# Patient Record
Sex: FEMALE | Race: WHITE | Employment: OTHER | URBAN - METROPOLITAN AREA
[De-identification: names, ages, dates, MRNs, and addresses within clinical notes are randomized per-mention and may not be internally consistent; named-entity substitution may affect disease eponyms.]

---

## 2021-11-28 ENCOUNTER — HOSPITAL ENCOUNTER (EMERGENCY)
Age: 86
Discharge: HOME | End: 2021-11-28
Attending: EMERGENCY MEDICINE
Payer: MEDICARE

## 2021-11-28 ENCOUNTER — APPOINTMENT (OUTPATIENT)
Dept: CT IMAGING | Age: 86
End: 2021-11-28
Attending: EMERGENCY MEDICINE
Payer: MEDICARE

## 2021-11-28 ENCOUNTER — APPOINTMENT (OUTPATIENT)
Dept: GENERAL RADIOLOGY | Age: 86
End: 2021-11-28
Attending: EMERGENCY MEDICINE
Payer: MEDICARE

## 2021-11-28 VITALS
TEMPERATURE: 97.8 F | HEART RATE: 91 BPM | OXYGEN SATURATION: 95 % | WEIGHT: 141.54 LBS | SYSTOLIC BLOOD PRESSURE: 135 MMHG | RESPIRATION RATE: 25 BRPM | DIASTOLIC BLOOD PRESSURE: 78 MMHG

## 2021-11-28 DIAGNOSIS — S40.021A CONTUSION OF RIGHT UPPER EXTREMITY, INITIAL ENCOUNTER: ICD-10-CM

## 2021-11-28 DIAGNOSIS — S00.83XA CONTUSION OF FACE, INITIAL ENCOUNTER: ICD-10-CM

## 2021-11-28 DIAGNOSIS — S80.11XA CONTUSION OF RIGHT LOWER LEG, INITIAL ENCOUNTER: ICD-10-CM

## 2021-11-28 DIAGNOSIS — S80.12XA CONTUSION OF LEFT LOWER LEG, INITIAL ENCOUNTER: ICD-10-CM
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
## 2021-11-28 PROCEDURE — 73590 X-RAY EXAM OF LOWER LEG: CPT

## 2021-11-28 PROCEDURE — 73060 X-RAY EXAM OF HUMERUS: CPT

## 2021-11-28 PROCEDURE — 70450 CT HEAD/BRAIN W/O DYE: CPT

## 2021-11-28 PROCEDURE — 99284 EMERGENCY DEPT VISIT MOD MDM: CPT

## 2021-11-28 PROCEDURE — 70486 CT MAXILLOFACIAL W/O DYE: CPT

## 2021-11-28 PROCEDURE — 74011250637 HC RX REV CODE- 250/637: Performed by: EMERGENCY MEDICINE

## 2021-11-28 RX ORDER — ACETAMINOPHEN 500 MG
1000 TABLET ORAL ONCE
Status: COMPLETED | OUTPATIENT
Start: 2021-11-28 | End: 2021-11-28

## 2021-11-28 RX ADMIN — ACETAMINOPHEN 1000 MG: 500 TABLET ORAL at 19:51

## 2021-11-28 NOTE — ED TRIAGE NOTES
Triage Note: Patient arrives to ER via EMS from [facility] for a fall. Patient reports slipping out of bed this morning. Denies hitting her head.

## 2021-11-29 NOTE — ED NOTES
Patient given discharge papers and instructions by primary RN> patient verbalized understanding and stated not having questions or concerns regarding her care. patient ambulatory out of ED with family.

## 2021-11-30 NOTE — ED PROVIDER NOTES
53-year-old female presents to the emergency department by EMS from assisted living facility after she states that she slipped while getting out of bed this morning. She states that she does not remember hitting her head but had some tenderness across the right side of her face, as well as noting tenderness to her right upper extremity where she has a large bruise over her right tricep/humeral region. She also notes bilateral lower extremity tenderness from the knees down across her shins that is particularly worse when she walks although she has been able to ambulate on them without significant difficulty. She denies any vision changes, difficulty speaking, walking, numbness or weakness. No past medical history on file. No past surgical history on file. No family history on file. Social History     Socioeconomic History    Marital status:      Spouse name: Not on file    Number of children: Not on file    Years of education: Not on file    Highest education level: Not on file   Occupational History    Not on file   Tobacco Use    Smoking status: Not on file    Smokeless tobacco: Not on file   Substance and Sexual Activity    Alcohol use: Not on file    Drug use: Not on file    Sexual activity: Not on file   Other Topics Concern    Not on file   Social History Narrative    Not on file     Social Determinants of Health     Financial Resource Strain:     Difficulty of Paying Living Expenses: Not on file   Food Insecurity:     Worried About Running Out of Food in the Last Year: Not on file    Kacie of Food in the Last Year: Not on file   Transportation Needs:     Lack of Transportation (Medical): Not on file    Lack of Transportation (Non-Medical):  Not on file   Physical Activity:     Days of Exercise per Week: Not on file    Minutes of Exercise per Session: Not on file   Stress:     Feeling of Stress : Not on file   Social Connections:     Frequency of Communication with Friends and Family: Not on file    Frequency of Social Gatherings with Friends and Family: Not on file    Attends Muslim Services: Not on file    Active Member of Clubs or Organizations: Not on file    Attends Club or Organization Meetings: Not on file    Marital Status: Not on file   Intimate Partner Violence:     Fear of Current or Ex-Partner: Not on file    Emotionally Abused: Not on file    Physically Abused: Not on file    Sexually Abused: Not on file   Housing Stability:     Unable to Pay for Housing in the Last Year: Not on file    Number of [place] in the Last Year: Not on file    Unstable Housing in the Last Year: Not on file         ALLERGIES: Patient has no known allergies. Review of Systems   Constitutional: Negative for activity change, appetite change, chills and fever. HENT: Negative for congestion, rhinorrhea, sinus pressure, sneezing and sore throat. Eyes: Negative for photophobia and visual disturbance. Respiratory: Negative for cough and shortness of breath. Cardiovascular: Negative for chest pain. Gastrointestinal: Negative for abdominal pain, blood in stool, constipation, diarrhea, nausea and vomiting. Genitourinary: Negative for difficulty urinating, dysuria, flank pain, frequency, hematuria, menstrual problem, urgency, vaginal bleeding and vaginal discharge. Musculoskeletal: Positive for arthralgias. Negative for back pain, myalgias and neck pain. Skin: Negative for rash and wound. Neurological: Negative for syncope, weakness, numbness and headaches. Psychiatric/Behavioral: Negative for self-injury and suicidal ideas. All other systems reviewed and are negative. Vitals:    11/28/21 1850 11/28/21 2000   BP: (!) 132/95 135/78   Pulse: 91    Resp: 25    Temp: 97.8 °F (36.6 °C)    SpO2: 94% 95%   Weight: 64.2 kg (141 lb 8.6 oz)             Physical Exam  Vitals and nursing note reviewed.    Constitutional:       General: She is not in acute distress. Appearance: Normal appearance. She is well-developed. She is not diaphoretic. HENT:      Head: Normocephalic. Contusion present. Nose: Nose normal.   Eyes:      Extraocular Movements: Extraocular movements intact. Conjunctiva/sclera: Conjunctivae normal.      Pupils: Pupils are equal, round, and reactive to light. Cardiovascular:      Rate and Rhythm: Normal rate and regular rhythm. Heart sounds: Normal heart sounds. Pulmonary:      Effort: Pulmonary effort is normal.      Breath sounds: Normal breath sounds. Chest:      Chest wall: No tenderness. Abdominal:      General: There is no distension. Palpations: Abdomen is soft. Tenderness: There is no abdominal tenderness. Musculoskeletal:         General: Tenderness and signs of injury present. Arms:       Cervical back: Neck supple. Legs:    Skin:     General: Skin is warm and dry. Neurological:      General: No focal deficit present. Mental Status: She is alert and oriented to person, place, and time. Cranial Nerves: No cranial nerve deficit. Sensory: No sensory deficit. Motor: No weakness. Coordination: Coordination normal.      Comments: Intact sensation, 5/5 strength in all 4 extremities, intact finger to nose, neg pronator drift, fluent speech, CN intact. MDM   72-year-old female presents with GL F from assisted living facility. X-rays were reviewed by myself and read by radiology showing no acute bony abnormalities. CT head and CT maxillofacial negative for any acute intracranial abnormalities. Reassurance was given. She was given dose of Tylenol in the ED and recommended the same. Can also use ice packs for symptomatic relief and PCP follow-up was recommended as needed. Return precautions were given for worsening or concerns. Please note that this dictation was completed with [employer], the [employer] voice recognition software.   Quite often unanticipated grammatical, syntax, homophones, and other interpretive errors are inadvertently transcribed by the computer software. Please disregard these errors. Please excuse any errors that have escaped final proofreading.     Procedures

## 2023-08-21 ENCOUNTER — HOSPITAL ENCOUNTER (OUTPATIENT)
Facility: HOSPITAL | Age: 88
Discharge: HOME | End: 2023-08-24

## 2023-08-21 DIAGNOSIS — Z12.31 VISIT FOR SCREENING MAMMOGRAM: ICD-10-CM

## 2024-02-15 ENCOUNTER — HOSPITAL ENCOUNTER (EMERGENCY)
Facility: HOSPITAL | Age: 89
Discharge: HOME | End: 2024-02-15
Attending: EMERGENCY MEDICINE
Payer: MEDICARE

## 2024-02-15 ENCOUNTER — APPOINTMENT (OUTPATIENT)
Facility: HOSPITAL | Age: 89
End: 2024-02-15
Payer: MEDICARE

## 2024-02-15 VITALS
DIASTOLIC BLOOD PRESSURE: 64 MMHG | HEIGHT: 65 IN | TEMPERATURE: 97.7 F | WEIGHT: 144.4 LBS | BODY MASS INDEX: 24.06 KG/M2 | HEART RATE: 57 BPM | SYSTOLIC BLOOD PRESSURE: 125 MMHG | OXYGEN SATURATION: 95 % | RESPIRATION RATE: 16 BRPM

## 2024-02-15 DIAGNOSIS — R09.89 CHOKING EPISODE: Primary | ICD-10-CM

## 2024-02-15 PROCEDURE — 71045 X-RAY EXAM CHEST 1 VIEW: CPT

## 2024-02-15 PROCEDURE — 99283 EMERGENCY DEPT VISIT LOW MDM: CPT

## 2024-02-16 NOTE — ED TRIAGE NOTES
Patient advised that she was eating a sandwich about 1815 hours, shortly thereafter she began choking and was coughing. EMS advised that she was able to cough up a piece of tomato and since then she has not been coughing. Stated at this time she feels fine. Able to speak clearly, no dyspnea appreciated.

## 2024-02-16 NOTE — DISCHARGE INSTRUCTIONS
Please monitor for any future signs of aspiration pneumonia.  Your x-ray today is normal.  You do not need antibiotics.

## 2024-02-16 NOTE — ED NOTES
Patient left ED in no acute distress, alert and oriented x4. Patient was encourage to come back if symptoms get worse. Patient was provided with discharge instructions. All questions were answered. Patient left ambulatory with a walker.

## 2024-02-16 NOTE — ED PROVIDER NOTES
[facility]  EMERGENCY DEPARTMENT ENCOUNTER      Pt Name: Marielle Godoy  MRN: [id number]  Birthdate 1935  Date of evaluation: 2/15/2024  Provider: Maurice Middleton MD      HISTORY OF PRESENT ILLNESS      89-year-old female with a history of essential tremor presenting to the ER for a choking episode.  [facility]  EMS was called, they noticed her to be having a frequent hacking cough.  On the right over she eventually coughed up a piece of tomato.  This resolved her coughing spell.  Patient states she feels fine upon arrival to the ER.  Denies any ongoing dyspnea.  No hypoxia in route per EMS              Nursing Notes were reviewed.    REVIEW OF SYSTEMS         Review of Systems   Constitutional:  Negative for chills and fever.   HENT:  Negative for congestion and sore throat.    Eyes:  Negative for pain and visual disturbance.   Respiratory:  Positive for cough, choking and shortness of breath. Negative for chest tightness.    Cardiovascular:  Negative for chest pain and leg swelling.   Gastrointestinal:  Negative for abdominal pain and vomiting.   Genitourinary:  Negative for dysuria.   Musculoskeletal:  Negative for back pain.   Skin:  Negative for rash.   Neurological:  Positive for tremors. Negative for weakness and headaches.   All other systems reviewed and are negative.          PAST MEDICAL HISTORY     Past Medical History:   Diagnosis Date    Breast cancer (HCC)          SURGICAL HISTORY     No past surgical history on file.      CURRENT MEDICATIONS       Previous Medications    No medications on file       ALLERGIES     Patient has no known allergies.    FAMILY HISTORY     No family history on file.       SOCIAL HISTORY       Social History     Socioeconomic History    Marital status:          PHYSICAL EXAM       ED Triage Vitals [02/15/24 2002]   BP Temp Temp Source Pulse Respirations SpO2 Height Weight - Scale   125/64 97.7 °F

## 2024-04-18 ENCOUNTER — HOSPITAL ENCOUNTER (OUTPATIENT)
Facility: HOSPITAL | Age: 89
Discharge: HOME | End: 2024-04-18
Payer: MEDICARE

## 2024-04-18 DIAGNOSIS — R13.10 DYSPHAGIA, UNSPECIFIED TYPE: ICD-10-CM

## 2024-04-18 PROCEDURE — 74220 X-RAY XM ESOPHAGUS 1CNTRST: CPT

## 2024-08-12 ENCOUNTER — ANESTHESIA EVENT (OUTPATIENT)
Facility: HOSPITAL | Age: 89
End: 2024-08-12
Payer: MEDICARE

## 2024-08-12 ENCOUNTER — HOSPITAL ENCOUNTER (OUTPATIENT)
Facility: HOSPITAL | Age: 89
Setting detail: OUTPATIENT SURGERY
Discharge: HOME | End: 2024-08-12
Attending: INTERNAL MEDICINE | Admitting: INTERNAL MEDICINE
Payer: MEDICARE

## 2024-08-12 ENCOUNTER — ANESTHESIA (OUTPATIENT)
Facility: HOSPITAL | Age: 89
End: 2024-08-12
Payer: MEDICARE

## 2024-08-12 VITALS
BODY MASS INDEX: 24.75 KG/M2 | TEMPERATURE: 97.3 F | OXYGEN SATURATION: 99 % | SYSTOLIC BLOOD PRESSURE: 142 MMHG | HEIGHT: 64 IN | WEIGHT: 145 LBS | RESPIRATION RATE: 24 BRPM | HEART RATE: 53 BPM | DIASTOLIC BLOOD PRESSURE: 69 MMHG

## 2024-08-12 PROCEDURE — 2709999900 HC NON-CHARGEABLE SUPPLY: Performed by: INTERNAL MEDICINE

## 2024-08-12 PROCEDURE — 2720000010 HC SURG SUPPLY STERILE: Performed by: INTERNAL MEDICINE

## 2024-08-12 PROCEDURE — 3700000000 HC ANESTHESIA ATTENDED CARE: Performed by: INTERNAL MEDICINE

## 2024-08-12 PROCEDURE — C1726 CATH, BAL DIL, NON-VASCULAR: HCPCS | Performed by: INTERNAL MEDICINE

## 2024-08-12 PROCEDURE — 2500000003 HC RX 250 WO HCPCS

## 2024-08-12 PROCEDURE — 2580000003 HC RX 258: Performed by: INTERNAL MEDICINE

## 2024-08-12 PROCEDURE — 7100000010 HC PHASE II RECOVERY - FIRST 15 MIN: Performed by: INTERNAL MEDICINE

## 2024-08-12 PROCEDURE — 88305 TISSUE EXAM BY PATHOLOGIST: CPT

## 2024-08-12 PROCEDURE — 7100000011 HC PHASE II RECOVERY - ADDTL 15 MIN: Performed by: INTERNAL MEDICINE

## 2024-08-12 PROCEDURE — 3600007501: Performed by: INTERNAL MEDICINE

## 2024-08-12 PROCEDURE — 6360000002 HC RX W HCPCS

## 2024-08-12 RX ORDER — SODIUM CHLORIDE 0.9 % (FLUSH) 0.9 %
5-40 SYRINGE (ML) INJECTION PRN
Status: DISCONTINUED | OUTPATIENT
Start: 2024-08-12 | End: 2024-08-12 | Stop reason: HOSPADM

## 2024-08-12 RX ORDER — PRIMIDONE 50 MG/1
150 TABLET ORAL 2 TIMES DAILY
COMMUNITY
Start: 2024-03-04

## 2024-08-12 RX ORDER — SODIUM CHLORIDE 9 MG/ML
25 INJECTION, SOLUTION INTRAVENOUS PRN
Status: DISCONTINUED | OUTPATIENT
Start: 2024-08-12 | End: 2024-08-12 | Stop reason: HOSPADM

## 2024-08-12 RX ORDER — SODIUM CHLORIDE 9 MG/ML
INJECTION, SOLUTION INTRAVENOUS CONTINUOUS
Status: DISCONTINUED | OUTPATIENT
Start: 2024-08-12 | End: 2024-08-12 | Stop reason: HOSPADM

## 2024-08-12 RX ORDER — SODIUM CHLORIDE 0.9 % (FLUSH) 0.9 %
5-40 SYRINGE (ML) INJECTION EVERY 12 HOURS SCHEDULED
Status: DISCONTINUED | OUTPATIENT
Start: 2024-08-12 | End: 2024-08-12 | Stop reason: HOSPADM

## 2024-08-12 RX ORDER — PROPRANOLOL HCL 60 MG
60 CAPSULE, EXTENDED RELEASE 24HR ORAL DAILY
COMMUNITY
Start: 2024-07-23

## 2024-08-12 RX ORDER — ATORVASTATIN CALCIUM 20 MG/1
20 TABLET, FILM COATED ORAL DAILY
COMMUNITY

## 2024-08-12 RX ORDER — PANTOPRAZOLE SODIUM 20 MG/1
20 TABLET, DELAYED RELEASE ORAL
COMMUNITY
Start: 2024-05-03

## 2024-08-12 RX ORDER — OMEPRAZOLE 40 MG/1
40 CAPSULE, DELAYED RELEASE ORAL
Status: ON HOLD | COMMUNITY
End: 2024-08-12 | Stop reason: HOSPADM

## 2024-08-12 RX ADMIN — PROPOFOL 25 MG: 10 INJECTION, EMULSION INTRAVENOUS at 11:55

## 2024-08-12 RX ADMIN — PROPOFOL 25 MG: 10 INJECTION, EMULSION INTRAVENOUS at 11:51

## 2024-08-12 RX ADMIN — SODIUM CHLORIDE: 9 INJECTION, SOLUTION INTRAVENOUS at 11:46

## 2024-08-12 RX ADMIN — PROPOFOL 25 MG: 10 INJECTION, EMULSION INTRAVENOUS at 11:53

## 2024-08-12 RX ADMIN — LIDOCAINE HYDROCHLORIDE 60 MG: 20 INJECTION, SOLUTION EPIDURAL; INFILTRATION; INTRACAUDAL; PERINEURAL at 11:49

## 2024-08-12 RX ADMIN — PROPOFOL 50 MG: 10 INJECTION, EMULSION INTRAVENOUS at 11:49

## 2024-08-12 ASSESSMENT — PAIN - FUNCTIONAL ASSESSMENT
PAIN_FUNCTIONAL_ASSESSMENT: NONE - DENIES PAIN

## 2024-08-12 NOTE — H&P
[address] [address]      History and Physical       NAME:  Marielle Godoy   :   1935   MRN:   [id number]             History of Present Illness:  Patient is a 89 y.o. who is seen for dysphagia .     PMH:  Past Medical History:   Diagnosis Date    Breast cancer (HCC)     Melanoma (HCC)        PSH:  Past Surgical History:   Procedure Laterality Date    BREAST SURGERY      lumpectomy    HYSTERECTOMY (CERVIX STATUS UNKNOWN)      LYMPHADENECTOMY         Allergies:  No Known Allergies    Home Medications:  Prior to Admission Medications   Prescriptions Last Dose Informant Patient Reported? Taking?   atorvastatin (LIPITOR) 20 MG tablet 2024  Yes Yes   Sig: Take 1 tablet by mouth daily   omeprazole (PRILOSEC) 40 MG delayed release capsule Not Taking  Yes No   Sig: Take 1 capsule by mouth every morning (before breakfast)   Patient not taking: Reported on 2024   pantoprazole (PROTONIX) 20 MG tablet 2024  Yes Yes   Sig: Take 1 tablet by mouth every morning (before breakfast)   primidone (MYSOLINE) 50 MG tablet 2024  Yes Yes   Sig: Take 3 tablets by mouth 2 times daily   propranolol (INDERAL LA) 60 MG extended release capsule 2024  Yes Yes   Sig: Take 1 capsule by mouth daily      Facility-Administered Medications: None       Hospital Medications:  Current Facility-Administered Medications   Medication Dose Route Frequency    0.9 % sodium chloride infusion   IntraVENous Continuous    sodium chloride flush 0.9 % injection 5-40 mL  5-40 mL IntraVENous 2 times per day    sodium chloride flush 0.9 % injection 5-40 mL  5-40 mL IntraVENous PRN    0.9 % sodium chloride infusion  25 mL IntraVENous PRN       Social History:  Social History     Tobacco Use    Smoking status: Never    Smokeless tobacco: Never   Substance Use Topics    Alcohol use: Not Currently       Family History:  History reviewed. No pertinent family history.      The patient was

## 2024-08-12 NOTE — DISCHARGE INSTRUCTIONS
[facility]  [address], [place] [address]    EGD DISCHARGE INSTRUCTIONS    Marielle Godoy  [id number]  1935    Discomfort:  Sore throat- throat lozenges or warm salt water gargle  redness at IV site- apply warm compress to area; if redness or soreness persist- contact your physician  Gaseous discomfort- walking, belching will help relieve any discomfort  You may not operate a vehicle for 12 hours  You may not engage in an occupation involving machinery or appliances for rest of today  You may not drink alcoholic beverages for at least 12 hours  Avoid making any critical decisions for at least 24 hour  DIET  You may resume your regular diet - however -  remember your colon is empty and a heavy meal will produce gas.   Avoid these foods:  vegetables, fried / greasy foods, carbonated drinks    ACTIVITY  You may resume your normal daily activities   Spend the remainder of the day resting -  avoid any strenuous activity.    CALL M.D.  ANY SIGN OF   Increasing pain, nausea, vomiting  Abdominal distension (swelling)  New increased bleeding (oral or rectal)  Fever (chills)  Pain in chest area  Bloody discharge from nose or mouth  Shortness of breath    Follow-up Instructions:   Call Dr. Noe Gale for any questions or problems, and follow up in 4 months   Telephone # [phone]    ENDOSCOPY FINDINGS:   Your endoscopy showed benign esophageal ring, I dilated, and small hiatal hernia, rest of exam was normal .    Signed By: Noe Gale MD     8/12/2024  12:02 PM

## 2024-08-12 NOTE — ANESTHESIA PRE PROCEDURE
Department of Anesthesiology  Preprocedure Note       Name:  Marielle Godoy   Age:  89 y.o.  :  1935                                          MRN:  [id number]         Date:  2024      Surgeon: Surgeon(s):  Noe Gale MD    Procedure: Procedure(s):  ESOPHAGOGASTRODUODENOSCOPY    Medications prior to admission:   Prior to Admission medications    Medication Sig Start Date End Date Taking? Authorizing Provider   atorvastatin (LIPITOR) 20 MG tablet Take 1 tablet by mouth daily   Yes Shiela Becerril MD   pantoprazole (PROTONIX) 20 MG tablet Take 1 tablet by mouth every morning (before breakfast) 5/3/24  Yes Shiela Becerril MD   primidone (MYSOLINE) 50 MG tablet Take 3 tablets by mouth 2 times daily 3/4/24  Yes ProviderShiela MD   propranolol (INDERAL LA) 60 MG extended release capsule Take 1 capsule by mouth daily 24  Yes ProviderShiela MD       Current medications:    Current Facility-Administered Medications   Medication Dose Route Frequency Provider Last Rate Last Admin    0.9 % sodium chloride infusion   IntraVENous Continuous Noe Gale MD   New Bag at 24 1146    sodium chloride flush 0.9 % injection 5-40 mL  5-40 mL IntraVENous 2 times per day Noe Gale MD        sodium chloride flush 0.9 % injection 5-40 mL  5-40 mL IntraVENous PRN Noe Gale MD        0.9 % sodium chloride infusion  25 mL IntraVENous PRN Noe Gale MD           Allergies:  No Known Allergies    Problem List:  There is no problem list on file for this patient.      Past Medical History:        Diagnosis Date    Breast cancer (HCC)     Melanoma (HCC)        Past Surgical History:        Procedure Laterality Date    BREAST SURGERY      lumpectomy    HYSTERECTOMY (CERVIX STATUS UNKNOWN)      LYMPHADENECTOMY         Social History:    Social History     Tobacco Use    Smoking status: Never    Smokeless tobacco: Never   Substance Use Topics    Alcohol use: Not

## 2024-08-12 NOTE — PROGRESS NOTES
Verified patient name and date of birth, scheduled procedure, and informed consent. Reviewed general discharge instructions and  information.  Assessed patient. Awake, alert, and oriented per baseline. Vital signs stable (see vital sign flowsheet). Respiratory status within defined limits, abdomen soft and non tender. Skin with in defined limits.     Initial RN admission and assessment performed and documented in Endoscopy navigator.     Patient evaluated by anesthesia in pre-procedure holding.     All procedural vital signs, airway assessment, and level of consciousness information monitored and recorded by anesthesia staff on the anesthesia record.     Report received from CRNA post procedure.  Patient transported to recovery area by RN.    Endoscopy post procedure time out was performed and specimens were verified by physician.    Endoscope was pre-cleaned at bedside immediately following procedure by Gisela.

## 2024-08-12 NOTE — OP NOTE
[facility]  [address], [place] [address]        Esophago- Gastroduodenoscopy (EGD) Procedure Note    Marielle Godoy  1935  [id number]      Procedure: Endoscopic Gastroduodenoscopy with biopsy, esophageal dilation    Indication: dysphagia      Pre-operative Diagnosis: see indication above    Post-operative Diagnosis: see findings below    : Noe Gale MD    Surgical Assistant: Circulator: Gisela Prakash, HÉCTOR; Jenny Almaguer RN    Implants:  None    Referring Provider:  Jan Gonzales MD      Anesthesia/Sedation:  MAC anesthesia Propofol        Procedure Details     After infomed consent was obtained for the procedure, with all risks and benefits of procedure explained the patient was taken to the endoscopy suite and placed in the left lateral decubitus position.  Following sequential administration of sedation as per above, the endoscope was inserted into the mouth and advanced under direct vision to third portion of the duodenum.  A careful inspection was made as the gastroscope was withdrawn, including a retroflexed view of the proximal stomach; findings and interventions are described below.      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           Impression:    -See post-procedure diagnoses.    Recommendations:  -Continue acid suppression.  -follow up in 4 months       Signed By: Noe Gale MD     8/12/2024  11:59 AM

## 2024-11-22 ENCOUNTER — APPOINTMENT (OUTPATIENT)
Facility: HOSPITAL | Age: 89
DRG: 871 | End: 2024-11-22
Payer: MEDICARE

## 2024-11-22 ENCOUNTER — HOSPITAL ENCOUNTER (INPATIENT)
Facility: HOSPITAL | Age: 89
LOS: 5 days | Discharge: NURSING, REHAB OR LONG-TERM CARE | DRG: 871 | End: 2024-11-27
Attending: STUDENT IN AN ORGANIZED HEALTH CARE EDUCATION/TRAINING PROGRAM | Admitting: INTERNAL MEDICINE
Payer: MEDICARE

## 2024-11-22 DIAGNOSIS — R79.89 ELEVATED TROPONIN: Primary | ICD-10-CM
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
## 2024-11-22 DIAGNOSIS — R74.8 ELEVATED CK: ICD-10-CM

## 2024-11-22 DIAGNOSIS — D72.829 LEUKOCYTOSIS, UNSPECIFIED TYPE: ICD-10-CM

## 2024-11-22 PROBLEM — A41.9 SEPSIS (HCC): Status: ACTIVE | Noted: 2024-11-22

## 2024-11-22 LAB
ALBUMIN SERPL-MCNC: 3.9 G/DL (ref 3.5–5)
ALBUMIN/GLOB SERPL: 1 (ref 1.1–2.2)
ALP SERPL-CCNC: 150 U/L (ref 45–117)
ALT SERPL-CCNC: 39 U/L (ref 12–78)
ANION GAP SERPL CALC-SCNC: 16 MMOL/L (ref 2–12)
APPEARANCE UR: CLEAR
AST SERPL-CCNC: 44 U/L (ref 15–37)
BACTERIA URNS QL MICRO: NEGATIVE /HPF
BASOPHILS # BLD: 0 K/UL (ref 0–0.1)
BASOPHILS NFR BLD: 0 % (ref 0–1)
BILIRUB SERPL-MCNC: 0.8 MG/DL (ref 0.2–1)
BILIRUB UR QL: NEGATIVE
BUN SERPL-MCNC: 35 MG/DL (ref 6–20)
BUN/CREAT SERPL: 25 (ref 12–20)
CALCIUM SERPL-MCNC: 10 MG/DL (ref 8.5–10.1)
CHLORIDE SERPL-SCNC: 98 MMOL/L (ref 97–108)
CK SERPL-CCNC: 573 U/L (ref 26–192)
CO2 SERPL-SCNC: 24 MMOL/L (ref 21–32)
COLOR UR: ABNORMAL
COMMENT:: NORMAL
CREAT SERPL-MCNC: 1.39 MG/DL (ref 0.55–1.02)
DIFFERENTIAL METHOD BLD: ABNORMAL
EKG ATRIAL RATE: 102 BPM
EKG DIAGNOSIS: NORMAL
EKG P AXIS: 15 DEGREES
EKG P-R INTERVAL: 166 MS
EKG Q-T INTERVAL: 308 MS
EKG QRS DURATION: 66 MS
EKG QTC CALCULATION (BAZETT): 397 MS
EKG R AXIS: -41 DEGREES
EKG T AXIS: 69 DEGREES
EKG VENTRICULAR RATE: 100 BPM
EOSINOPHIL # BLD: 0 K/UL (ref 0–0.4)
EOSINOPHIL NFR BLD: 0 % (ref 0–7)
EPITH CASTS URNS QL MICRO: ABNORMAL /LPF
ERYTHROCYTE [DISTWIDTH] IN BLOOD BY AUTOMATED COUNT: 12.4 % (ref 11.5–14.5)
GLOBULIN SER CALC-MCNC: 3.9 G/DL (ref 2–4)
GLUCOSE SERPL-MCNC: 129 MG/DL (ref 65–100)
GLUCOSE UR STRIP.AUTO-MCNC: NEGATIVE MG/DL
HCT VFR BLD AUTO: 50.7 % (ref 35–47)
HGB BLD-MCNC: 17.1 G/DL (ref 11.5–16)
HGB UR QL STRIP: ABNORMAL
IMM GRANULOCYTES # BLD AUTO: 0.1 K/UL (ref 0–0.04)
IMM GRANULOCYTES NFR BLD AUTO: 1 % (ref 0–0.5)
KETONES UR QL STRIP.AUTO: ABNORMAL MG/DL
LACTATE SERPL-SCNC: 2.2 MMOL/L (ref 0.4–2)
LACTATE SERPL-SCNC: 2.3 MMOL/L (ref 0.4–2)
LEUKOCYTE ESTERASE UR QL STRIP.AUTO: NEGATIVE
LIPASE SERPL-CCNC: 34 U/L (ref 13–75)
LYMPHOCYTES # BLD: 1.7 K/UL (ref 0.8–3.5)
LYMPHOCYTES NFR BLD: 8 % (ref 12–49)
MCH RBC QN AUTO: 31 PG (ref 26–34)
MCHC RBC AUTO-ENTMCNC: 33.7 G/DL (ref 30–36.5)
MCV RBC AUTO: 91.8 FL (ref 80–99)
MONOCYTES # BLD: 1.4 K/UL (ref 0–1)
MONOCYTES NFR BLD: 7 % (ref 5–13)
NEUTS SEG # BLD: 16.8 K/UL (ref 1.8–8)
NEUTS SEG NFR BLD: 84 % (ref 32–75)
NITRITE UR QL STRIP.AUTO: NEGATIVE
NRBC # BLD: 0 K/UL (ref 0–0.01)
NRBC BLD-RTO: 0 PER 100 WBC
PH UR STRIP: 5 (ref 5–8)
PLATELET # BLD AUTO: 409 K/UL (ref 150–400)
PMV BLD AUTO: 10.3 FL (ref 8.9–12.9)
POTASSIUM SERPL-SCNC: 4.4 MMOL/L (ref 3.5–5.1)
PROT SERPL-MCNC: 7.8 G/DL (ref 6.4–8.2)
PROT UR STRIP-MCNC: NEGATIVE MG/DL
RBC # BLD AUTO: 5.52 M/UL (ref 3.8–5.2)
RBC #/AREA URNS HPF: ABNORMAL /HPF (ref 0–5)
SODIUM SERPL-SCNC: 138 MMOL/L (ref 136–145)
SP GR UR REFRACTOMETRY: 1.02 (ref 1–1.03)
SPECIMEN HOLD: NORMAL
TROPONIN I SERPL HS-MCNC: 111 NG/L (ref 0–51)
TROPONIN I SERPL HS-MCNC: 113 NG/L (ref 0–51)
URINE CULTURE IF INDICATED: ABNORMAL
UROBILINOGEN UR QL STRIP.AUTO: 0.2 EU/DL (ref 0.2–1)
WBC # BLD AUTO: 20 K/UL (ref 3.6–11)
WBC URNS QL MICRO: ABNORMAL /HPF (ref 0–4)

## 2024-11-22 PROCEDURE — 80053 COMPREHEN METABOLIC PANEL: CPT

## 2024-11-22 PROCEDURE — 81001 URINALYSIS AUTO W/SCOPE: CPT

## 2024-11-22 PROCEDURE — 82550 ASSAY OF CK (CPK): CPT

## 2024-11-22 PROCEDURE — 70450 CT HEAD/BRAIN W/O DYE: CPT

## 2024-11-22 PROCEDURE — 87086 URINE CULTURE/COLONY COUNT: CPT

## 2024-11-22 PROCEDURE — 96365 THER/PROPH/DIAG IV INF INIT: CPT

## 2024-11-22 PROCEDURE — 83690 ASSAY OF LIPASE: CPT

## 2024-11-22 PROCEDURE — 2060000000 HC ICU INTERMEDIATE R&B

## 2024-11-22 PROCEDURE — 84484 ASSAY OF TROPONIN QUANT: CPT

## 2024-11-22 PROCEDURE — 83605 ASSAY OF LACTIC ACID: CPT

## 2024-11-22 PROCEDURE — 36415 COLL VENOUS BLD VENIPUNCTURE: CPT

## 2024-11-22 PROCEDURE — 1200000000 HC SEMI PRIVATE

## 2024-11-22 PROCEDURE — 85025 COMPLETE CBC W/AUTO DIFF WBC: CPT

## 2024-11-22 PROCEDURE — 6370000000 HC RX 637 (ALT 250 FOR IP): Performed by: STUDENT IN AN ORGANIZED HEALTH CARE EDUCATION/TRAINING PROGRAM

## 2024-11-22 PROCEDURE — 6360000002 HC RX W HCPCS: Performed by: STUDENT IN AN ORGANIZED HEALTH CARE EDUCATION/TRAINING PROGRAM

## 2024-11-22 PROCEDURE — 2580000003 HC RX 258: Performed by: STUDENT IN AN ORGANIZED HEALTH CARE EDUCATION/TRAINING PROGRAM

## 2024-11-22 PROCEDURE — 99285 EMERGENCY DEPT VISIT HI MDM: CPT

## 2024-11-22 PROCEDURE — 93005 ELECTROCARDIOGRAM TRACING: CPT | Performed by: STUDENT IN AN ORGANIZED HEALTH CARE EDUCATION/TRAINING PROGRAM

## 2024-11-22 PROCEDURE — 72125 CT NECK SPINE W/O DYE: CPT

## 2024-11-22 PROCEDURE — 96375 TX/PRO/DX INJ NEW DRUG ADDON: CPT

## 2024-11-22 PROCEDURE — 6360000004 HC RX CONTRAST MEDICATION: Performed by: STUDENT IN AN ORGANIZED HEALTH CARE EDUCATION/TRAINING PROGRAM

## 2024-11-22 PROCEDURE — 87040 BLOOD CULTURE FOR BACTERIA: CPT

## 2024-11-22 PROCEDURE — 74177 CT ABD & PELVIS W/CONTRAST: CPT

## 2024-11-22 PROCEDURE — 76705 ECHO EXAM OF ABDOMEN: CPT

## 2024-11-22 RX ORDER — IOPAMIDOL 755 MG/ML
100 INJECTION, SOLUTION INTRAVASCULAR
Status: COMPLETED | OUTPATIENT
Start: 2024-11-22 | End: 2024-11-22

## 2024-11-22 RX ORDER — 0.9 % SODIUM CHLORIDE 0.9 %
1000 INTRAVENOUS SOLUTION INTRAVENOUS ONCE
Status: COMPLETED | OUTPATIENT
Start: 2024-11-22 | End: 2024-11-22

## 2024-11-22 RX ORDER — 0.9 % SODIUM CHLORIDE 0.9 %
500 INTRAVENOUS SOLUTION INTRAVENOUS ONCE
Status: COMPLETED | OUTPATIENT
Start: 2024-11-22 | End: 2024-11-22

## 2024-11-22 RX ORDER — ASPIRIN 81 MG/1
324 TABLET, CHEWABLE ORAL
Status: COMPLETED | OUTPATIENT
Start: 2024-11-22 | End: 2024-11-22

## 2024-11-22 RX ADMIN — SODIUM CHLORIDE 1000 ML: 9 INJECTION, SOLUTION INTRAVENOUS at 14:58

## 2024-11-22 RX ADMIN — SODIUM CHLORIDE 1500 MG: 9 INJECTION, SOLUTION INTRAVENOUS at 16:23

## 2024-11-22 RX ADMIN — CEFEPIME 2000 MG: 2 INJECTION, POWDER, FOR SOLUTION INTRAVENOUS at 16:14

## 2024-11-22 RX ADMIN — SODIUM CHLORIDE 500 ML: 9 INJECTION, SOLUTION INTRAVENOUS at 17:32

## 2024-11-22 RX ADMIN — IOPAMIDOL 100 ML: 755 INJECTION, SOLUTION INTRAVENOUS at 14:36

## 2024-11-22 RX ADMIN — ASPIRIN 81 MG CHEWABLE TABLET 324 MG: 81 TABLET CHEWABLE at 14:56

## 2024-11-22 ASSESSMENT — PAIN - FUNCTIONAL ASSESSMENT: PAIN_FUNCTIONAL_ASSESSMENT: NONE - DENIES PAIN

## 2024-11-22 NOTE — ED TRIAGE NOTES
Presented to ED via [facility] from [facility] after being found by care partners lying on the bathroom floor this am. She remembers falling, but was unable to get up. Denies pain at current time. Staff and other residents reporting that she does not get around as much as she normally does and that her dementia is progressing.

## 2024-11-22 NOTE — CARE COORDINATION
CM received call from RN at [facility] regarding patient. Patient was found [place] the floor of [place] today, it is unknown if patient fell and how long patient was there for.  Patient has no record in the dinning osullivan of eating since Tuesday and medications were likely last taken Wednesday as family can see patient's pill container.     CM spoke with patients son Ahsan to gather more information. Roughly two months ago patient's son (RADHA) Ahsan Godoy [phone] was trying to work with the facility and patient's NP at [place] to get patient transitioned to the [facility] side due to patient's worsening dementia. Patient's NP at facility stated they were not able to accommodate that as the patient stated she did not see the need for this.     At this time family would like for patient to ultimately transition to [facility] at [facility] upon discharge as patient's cognitive and physical abilities are decreasing.      CM placed contact call to Mary with [facility] [phone] and left a  for a return call.  [facility]    5:09 PM  CM received call from Mary and was told that patient will return to Health Care side of facility and while patient is on the health care side, Admin will work with family/patient for [facility] transition.  CM called patient's son and left a detailed message with this update as well.        FANNIE Valentino   Care Manager  Available via [employer]

## 2024-11-22 NOTE — ED PROVIDER NOTES
sounds are normal.      Tenderness: There is no abdominal tenderness.   Musculoskeletal:      Right lower leg: No edema.      Left lower leg: No edema.   Skin:     General: Skin is warm.      Capillary Refill: Capillary refill takes less than 2 seconds.   Neurological:      General: No focal deficit present.      Mental Status: She is alert.   Psychiatric:         Mood and Affect: Mood normal.         DIAGNOSTIC RESULTS     EKG: All EKG's are interpreted by the Emergency Department Physician who either signs or Co-signs this chart in the absence of a cardiologist.        RADIOLOGY:   Non-plain film images such as CT, Ultrasound and MRI are read by the radiologist. Plain radiographic images are visualized and preliminarily interpreted by the emergency physician with the below findings:        Interpretation per the Radiologist below, if available at the time of this note:    CT Head W/O Contrast   Final Result   Moderate to underlying white matter disease.   Chronic right sphenoid sinus disease   No acute intracranial process identified.            Electronically signed by Makenna Alfaro      CT CSpine W/O Contrast    (Results Pending)   CT CHEST ABDOMEN PELVIS W CONTRAST Additional Contrast? None    (Results Pending)        LABS:  Labs Reviewed   CBC WITH AUTO DIFFERENTIAL - Abnormal; Notable for the following components:       Result Value    WBC 20.0 (*)     RBC 5.52 (*)     Hemoglobin 17.1 (*)     Hematocrit 50.7 (*)     Platelets 409 (*)     Neutrophils % 84 (*)     Lymphocytes % 8 (*)     Immature Granulocytes % 1 (*)     Neutrophils Absolute 16.8 (*)     Monocytes Absolute 1.4 (*)     Immature Granulocytes Absolute 0.1 (*)     All other components within normal limits   COMPREHENSIVE METABOLIC PANEL - Abnormal; Notable for the following components:    Anion Gap 16 (*)     Glucose 129 (*)     BUN 35 (*)     Creatinine 1.39 (*)     BUN/Creatinine Ratio 25 (*)     Est, Glom Filt Rate 37 (*)     AST 44 (*)     Alk

## 2024-11-23 LAB
ALBUMIN SERPL-MCNC: 3.1 G/DL (ref 3.5–5)
ALBUMIN/GLOB SERPL: 1.1 (ref 1.1–2.2)
ALP SERPL-CCNC: 102 U/L (ref 45–117)
ALT SERPL-CCNC: 31 U/L (ref 12–78)
ANION GAP SERPL CALC-SCNC: 7 MMOL/L (ref 2–12)
AST SERPL-CCNC: 32 U/L (ref 15–37)
BASOPHILS # BLD: 0 K/UL (ref 0–0.1)
BASOPHILS NFR BLD: 0 % (ref 0–1)
BILIRUB SERPL-MCNC: 0.7 MG/DL (ref 0.2–1)
BUN SERPL-MCNC: 29 MG/DL (ref 6–20)
BUN/CREAT SERPL: 30 (ref 12–20)
CALCIUM SERPL-MCNC: 9.2 MG/DL (ref 8.5–10.1)
CHLORIDE SERPL-SCNC: 114 MMOL/L (ref 97–108)
CK SERPL-CCNC: 195 U/L (ref 26–192)
CO2 SERPL-SCNC: 23 MMOL/L (ref 21–32)
CREAT SERPL-MCNC: 0.97 MG/DL (ref 0.55–1.02)
DIFFERENTIAL METHOD BLD: ABNORMAL
EKG ATRIAL RATE: 85 BPM
EKG DIAGNOSIS: NORMAL
EKG P AXIS: 61 DEGREES
EKG P-R INTERVAL: 154 MS
EKG Q-T INTERVAL: 382 MS
EKG QRS DURATION: 92 MS
EKG QTC CALCULATION (BAZETT): 454 MS
EKG R AXIS: -55 DEGREES
EKG T AXIS: 52 DEGREES
EKG VENTRICULAR RATE: 85 BPM
EOSINOPHIL # BLD: 0 K/UL (ref 0–0.4)
EOSINOPHIL NFR BLD: 0 % (ref 0–7)
ERYTHROCYTE [DISTWIDTH] IN BLOOD BY AUTOMATED COUNT: 12.7 % (ref 11.5–14.5)
GGT SERPL-CCNC: 33 U/L (ref 5–55)
GLOBULIN SER CALC-MCNC: 2.9 G/DL (ref 2–4)
GLUCOSE SERPL-MCNC: 120 MG/DL (ref 65–100)
HCT VFR BLD AUTO: 40.7 % (ref 35–47)
HGB BLD-MCNC: 13.5 G/DL (ref 11.5–16)
IMM GRANULOCYTES # BLD AUTO: 0.1 K/UL (ref 0–0.04)
IMM GRANULOCYTES NFR BLD AUTO: 1 % (ref 0–0.5)
LYMPHOCYTES # BLD: 1.3 K/UL (ref 0.8–3.5)
LYMPHOCYTES NFR BLD: 9 % (ref 12–49)
MCH RBC QN AUTO: 31.5 PG (ref 26–34)
MCHC RBC AUTO-ENTMCNC: 33.2 G/DL (ref 30–36.5)
MCV RBC AUTO: 94.9 FL (ref 80–99)
MONOCYTES # BLD: 1.1 K/UL (ref 0–1)
MONOCYTES NFR BLD: 8 % (ref 5–13)
NEUTS SEG # BLD: 11.5 K/UL (ref 1.8–8)
NEUTS SEG NFR BLD: 82 % (ref 32–75)
NRBC # BLD: 0 K/UL (ref 0–0.01)
NRBC BLD-RTO: 0 PER 100 WBC
PLATELET # BLD AUTO: 309 K/UL (ref 150–400)
PMV BLD AUTO: 10.5 FL (ref 8.9–12.9)
POTASSIUM SERPL-SCNC: 4.2 MMOL/L (ref 3.5–5.1)
PROT SERPL-MCNC: 6 G/DL (ref 6.4–8.2)
RBC # BLD AUTO: 4.29 M/UL (ref 3.8–5.2)
SODIUM SERPL-SCNC: 144 MMOL/L (ref 136–145)
WBC # BLD AUTO: 14.1 K/UL (ref 3.6–11)

## 2024-11-23 PROCEDURE — 36415 COLL VENOUS BLD VENIPUNCTURE: CPT

## 2024-11-23 PROCEDURE — 97165 OT EVAL LOW COMPLEX 30 MIN: CPT

## 2024-11-23 PROCEDURE — 80053 COMPREHEN METABOLIC PANEL: CPT

## 2024-11-23 PROCEDURE — 82977 ASSAY OF GGT: CPT

## 2024-11-23 PROCEDURE — 2580000003 HC RX 258: Performed by: STUDENT IN AN ORGANIZED HEALTH CARE EDUCATION/TRAINING PROGRAM

## 2024-11-23 PROCEDURE — 6360000002 HC RX W HCPCS: Performed by: HOSPITALIST

## 2024-11-23 PROCEDURE — 6360000002 HC RX W HCPCS: Performed by: STUDENT IN AN ORGANIZED HEALTH CARE EDUCATION/TRAINING PROGRAM

## 2024-11-23 PROCEDURE — 85025 COMPLETE CBC W/AUTO DIFF WBC: CPT

## 2024-11-23 PROCEDURE — 93005 ELECTROCARDIOGRAM TRACING: CPT | Performed by: STUDENT IN AN ORGANIZED HEALTH CARE EDUCATION/TRAINING PROGRAM

## 2024-11-23 PROCEDURE — 2060000000 HC ICU INTERMEDIATE R&B

## 2024-11-23 PROCEDURE — 97530 THERAPEUTIC ACTIVITIES: CPT

## 2024-11-23 PROCEDURE — 97161 PT EVAL LOW COMPLEX 20 MIN: CPT

## 2024-11-23 PROCEDURE — 82550 ASSAY OF CK (CPK): CPT

## 2024-11-23 PROCEDURE — 2580000003 HC RX 258: Performed by: HOSPITALIST

## 2024-11-23 RX ORDER — SODIUM CHLORIDE 9 MG/ML
INJECTION, SOLUTION INTRAVENOUS PRN
Status: DISCONTINUED | OUTPATIENT
Start: 2024-11-23 | End: 2024-11-27 | Stop reason: HOSPADM

## 2024-11-23 RX ORDER — ONDANSETRON 4 MG/1
4 TABLET, ORALLY DISINTEGRATING ORAL EVERY 8 HOURS PRN
Status: DISCONTINUED | OUTPATIENT
Start: 2024-11-23 | End: 2024-11-27 | Stop reason: HOSPADM

## 2024-11-23 RX ORDER — SODIUM CHLORIDE 9 MG/ML
INJECTION, SOLUTION INTRAVENOUS CONTINUOUS
Status: DISCONTINUED | OUTPATIENT
Start: 2024-11-23 | End: 2024-11-23

## 2024-11-23 RX ORDER — ONDANSETRON 2 MG/ML
4 INJECTION INTRAMUSCULAR; INTRAVENOUS EVERY 6 HOURS PRN
Status: DISCONTINUED | OUTPATIENT
Start: 2024-11-23 | End: 2024-11-27 | Stop reason: HOSPADM

## 2024-11-23 RX ORDER — SODIUM CHLORIDE 0.9 % (FLUSH) 0.9 %
5-40 SYRINGE (ML) INJECTION PRN
Status: DISCONTINUED | OUTPATIENT
Start: 2024-11-23 | End: 2024-11-27 | Stop reason: HOSPADM

## 2024-11-23 RX ORDER — HEPARIN SODIUM 5000 [USP'U]/ML
5000 INJECTION, SOLUTION INTRAVENOUS; SUBCUTANEOUS EVERY 8 HOURS SCHEDULED
Status: DISCONTINUED | OUTPATIENT
Start: 2024-11-23 | End: 2024-11-27 | Stop reason: HOSPADM

## 2024-11-23 RX ORDER — SODIUM CHLORIDE 0.9 % (FLUSH) 0.9 %
5-40 SYRINGE (ML) INJECTION EVERY 12 HOURS SCHEDULED
Status: DISCONTINUED | OUTPATIENT
Start: 2024-11-23 | End: 2024-11-27 | Stop reason: HOSPADM

## 2024-11-23 RX ORDER — ACETAMINOPHEN 650 MG/1
650 SUPPOSITORY RECTAL EVERY 6 HOURS PRN
Status: DISCONTINUED | OUTPATIENT
Start: 2024-11-23 | End: 2024-11-27 | Stop reason: HOSPADM

## 2024-11-23 RX ORDER — SODIUM CHLORIDE 9 MG/ML
INJECTION, SOLUTION INTRAVENOUS CONTINUOUS
Status: DISPENSED | OUTPATIENT
Start: 2024-11-23 | End: 2024-11-24

## 2024-11-23 RX ORDER — POLYETHYLENE GLYCOL 3350 17 G/17G
17 POWDER, FOR SOLUTION ORAL DAILY PRN
Status: DISCONTINUED | OUTPATIENT
Start: 2024-11-23 | End: 2024-11-27 | Stop reason: HOSPADM

## 2024-11-23 RX ORDER — ACETAMINOPHEN 325 MG/1
650 TABLET ORAL EVERY 6 HOURS PRN
Status: DISCONTINUED | OUTPATIENT
Start: 2024-11-23 | End: 2024-11-27 | Stop reason: HOSPADM

## 2024-11-23 RX ADMIN — CEFEPIME 1000 MG: 1 INJECTION, POWDER, FOR SOLUTION INTRAMUSCULAR; INTRAVENOUS at 02:51

## 2024-11-23 RX ADMIN — HEPARIN SODIUM 5000 UNITS: 5000 INJECTION INTRAVENOUS; SUBCUTANEOUS at 22:03

## 2024-11-23 RX ADMIN — HEPARIN SODIUM 5000 UNITS: 5000 INJECTION INTRAVENOUS; SUBCUTANEOUS at 14:04

## 2024-11-23 RX ADMIN — SODIUM CHLORIDE, PRESERVATIVE FREE 10 ML: 5 INJECTION INTRAVENOUS at 10:26

## 2024-11-23 RX ADMIN — SODIUM CHLORIDE, PRESERVATIVE FREE 10 ML: 5 INJECTION INTRAVENOUS at 22:03

## 2024-11-23 RX ADMIN — SODIUM CHLORIDE: 9 INJECTION, SOLUTION INTRAVENOUS at 02:36

## 2024-11-23 RX ADMIN — SODIUM CHLORIDE: 9 INJECTION, SOLUTION INTRAVENOUS at 10:28

## 2024-11-23 RX ADMIN — CEFEPIME 2000 MG: 2 INJECTION, POWDER, FOR SOLUTION INTRAVENOUS at 14:04

## 2024-11-23 RX ADMIN — HEPARIN SODIUM 5000 UNITS: 5000 INJECTION INTRAVENOUS; SUBCUTANEOUS at 06:14

## 2024-11-23 NOTE — CARE COORDINATION
RUR: 11%  Readmission? No  IM? Yes, 1st IM given on 11/22  ? N/A    Plan: SNF at [facility] to [facility] at [facility]. Pt is currently living in [facility]. Pt's family feels that pt is no longer able to safely live independently due to dementia and functional needs.    CM met with pt and pt's daughter-in-law, Krystal, at bedside to discuss discharge plan and complete initial case management assessment. Pt's family can provide her with transportation upon discharge if pt is not in need of S transport.    Pharmacy: [facility] [address] [place] [phone] [facility] - P [phone] - F [phone] [[id number]]     Pt lives alone at the address listed on her facesheet. Pt uses a rollator at home and was previously independent in her ADLs/IADLs. Pt does not have a hx of home health services, home O2, or a stay at a SNF/[facility] for rehab services. Pt is not a .     Pt's son and daughter-in-law attempted to transition pt to Assisted Living at [facility] earlier this year (in August). Pt wished to remain in Independent Living. Pt's son and daughter-in-law are concerned that pt is no longer able to safely live on her own in Independent Living due to her dementia and functional needs. They would like pt to discharge to SNF at [facility], and then transition to halfway at [facility]. Pt's daughter-in-law requested documentation from the attending medical provider that pt is no longer able to safely live independently without further supports. Case management to follow up on whether this can be provided.    Pt's daughter-in-law requested to speak with the liaison for [facility]. CM to request that the liaison contact pt's family members to answer any questions that they have. Referral was sent to [facility] by SERAFIN in the ED yesterday, 11/22. [facility] Liaison: Mary [phone].     Case management following for discharge planning.      11/23/24 8100   Service Assessment   Patient Orientation

## 2024-11-23 NOTE — H&P
History & Physical    Primary Care Provider: Isatu Anna APRN - NP  Source of Information: Patient and chart review    History of Presenting Illness:   Marielle Godoy is a 89 y.o. female with pmh of dementia, essential tremors, dyslipidmeia, gerd who presented to ed from [facility] for evaluation after being found down in her room. Per chart review, pt did not check in with staff last morning and a welfare check was performed.  She was reportedly found on her bathroom floor and brought to ed for evaluation.  She is seen and examined at bedside.  Answers simple questions appropriately.    The patient denies any fever, chills, chest or abdominal pain, nausea, vomiting, cough, congestion, recent illness, palpitations, or dysuria.  Remarkable vitals on ER Presentation: vss  Labs Remarkable for: wbc 20, hgb 17, plt 409, trop 111, ck 573, cr 1.39, LA 2.3  ER Images: ct hea and cervical spine: no acute process.  CT abd et pelvis: Mucous plugging in the right lower lobe, esophagitis  Concern for cholecystitis. Ultrasound could further characterize  Complex right lower renal pole cyst versus cystic mass.  ER Rx: asa, cefepime, 1.5L ns, vancomycin     Review of Systems:  Pertinent items are noted in the History of Present Illness.     Past Medical History:   Diagnosis Date    Breast cancer (HCC)     Dementia (HCC)     Dysphagia     Essential tremor     Hyperlipidemia     Melanoma (HCC)       Past Surgical History:   Procedure Laterality Date    BREAST SURGERY      lumpectomy    HYSTERECTOMY (CERVIX STATUS UNKNOWN)      LYMPHADENECTOMY      UPPER GASTROINTESTINAL ENDOSCOPY N/A 8/12/2024    ESOPHAGOGASTRODUODENOSCOPY performed by Noe Gale MD at [facility] ENDOSCOPY     Prior to Admission medications    Medication Sig Start Date End Date Taking? Authorizing Provider   atorvastatin (LIPITOR) 20 MG tablet Take 1 tablet by mouth daily    Provider, MD Shiela   pantoprazole (PROTONIX) 20

## 2024-11-23 NOTE — ED NOTES
Care management consult - Arash is going to call Mr. Godoy and assist with assisted services.   
Coughed and choked while administering 1 of 4 ASA. Airway patent, self-cleared. Placed NPO and aspiration risk.   
Mr. Godoy had questions regarding the process of moving from independent living to assisted living d/t advancing dementia, and has not recorded a check in with the dining service for 4 days. He stated that the staff at [facility] reported that they would need Mrs. Godoy to consent to transferring to the assisted housing. He had concerns regarding her mental status and whether or not his power of  gave him the ability to request the change. Advised that we could consult case management and senior services.   
Returned from CT, 0.9% NS infusion begun. Remains AOX4 and answers questions slowly but appropriate. Son is at bedside and update provided.   
TRANSFER - OUT REPORT:    Verbal report given to HÉCTOR Marin on Marielle Godoy  being transferred to [facility] for routine progression of patient care       Report consisted of patient's Situation, Background, Assessment and   Recommendations(SBAR).     Information from the following report(s) ED Encounter Summary was reviewed with the receiving nurse.    Amelia Fall Assessment:    Presents to emergency department  because of falls (Syncope, seizure, or loss of consciousness): Yes  Age > 70: Yes     Impaired Mobility: Ambulates or transfers with assistive devices or assistance; Unable to ambulate or transer.: Yes  Nursing Judgement: Yes          Lines:   Peripheral IV 11/22/24 Left Antecubital (Active)   Site Assessment Clean, dry & intact 11/22/24 1340   Line Status Blood return noted;Brisk blood return;Flushed;Normal saline locked;Specimen collected 11/22/24 1340   Line Care Connections checked and tightened 11/22/24 1340   Dressing Status New dressing applied;Clean, dry & intact 11/22/24 1340   Dressing Type Transparent 11/22/24 1340   Dressing Intervention New 11/22/24 1340       Peripheral IV 11/22/24 Right Antecubital (Active)   Site Assessment Clean, dry & intact 11/22/24 1412   Line Status Blood return noted;Brisk blood return;Flushed;Normal saline locked;Specimen collected 11/22/24 1412   Line Care Connections checked and tightened 11/22/24 1412   Dressing Status New dressing applied;Clean, dry & intact 11/22/24 1412   Dressing Type Transparent 11/22/24 1412   Dressing Intervention New 11/22/24 1412        Opportunity for questions and clarification was provided.           
06:32:34 PM    CONDITION:  Stable    Total critical care time (not including time spent performing separately reportable procedures): 32 minutes     Raúl Young MD      Hospitalist [facility] for Admission  6:32 PM    ED Room Number: SER04/04  Patient Name and age:  Marielle Godoy 89 y.o.  female  Working Diagnosis:   1. Elevated troponin    2. Fall, initial encounter    3. Elevated CK    4. Leukocytosis, unspecified type        COVID-19 Suspicion:  no    Code Status:  Full Code  Readmission: no  Isolation Requirements:  none  Recommended Level of Care:  telemetry  Department: [facility]  Other:  Hx dementia, HLD. Fall at nursing home. VS stable, labs with leukocytosis, mild troponin and lactate elevations. CT with possible cholecystitis, pt without pain or tenderness at site and f/u US less concerning. Treated empirically with Abx and fluids. Awaiting UA, no urine in bladder when straight cathed.    Signed By: Raúl Young MD     November 22, 2024           Raúl Young MD  11/22/24 [id number]

## 2024-11-24 LAB
ALBUMIN SERPL-MCNC: 3 G/DL (ref 3.5–5)
ALBUMIN/GLOB SERPL: 0.9 (ref 1.1–2.2)
ALP SERPL-CCNC: 94 U/L (ref 45–117)
ALT SERPL-CCNC: 30 U/L (ref 12–78)
ANION GAP SERPL CALC-SCNC: 8 MMOL/L (ref 2–12)
AST SERPL-CCNC: 25 U/L (ref 15–37)
BACTERIA SPEC CULT: NORMAL
BASOPHILS # BLD: 0 K/UL (ref 0–0.1)
BASOPHILS NFR BLD: 0 % (ref 0–1)
BILIRUB SERPL-MCNC: 0.6 MG/DL (ref 0.2–1)
BUN SERPL-MCNC: 24 MG/DL (ref 6–20)
BUN/CREAT SERPL: 28 (ref 12–20)
CALCIUM SERPL-MCNC: 9.2 MG/DL (ref 8.5–10.1)
CHLORIDE SERPL-SCNC: 112 MMOL/L (ref 97–108)
CO2 SERPL-SCNC: 25 MMOL/L (ref 21–32)
CREAT SERPL-MCNC: 0.85 MG/DL (ref 0.55–1.02)
DIFFERENTIAL METHOD BLD: ABNORMAL
EOSINOPHIL # BLD: 0.1 K/UL (ref 0–0.4)
EOSINOPHIL NFR BLD: 1 % (ref 0–7)
ERYTHROCYTE [DISTWIDTH] IN BLOOD BY AUTOMATED COUNT: 12.5 % (ref 11.5–14.5)
GLOBULIN SER CALC-MCNC: 3.2 G/DL (ref 2–4)
GLUCOSE SERPL-MCNC: 105 MG/DL (ref 65–100)
HCT VFR BLD AUTO: 40.4 % (ref 35–47)
HGB BLD-MCNC: 13.3 G/DL (ref 11.5–16)
IMM GRANULOCYTES # BLD AUTO: 0.1 K/UL (ref 0–0.04)
IMM GRANULOCYTES NFR BLD AUTO: 1 % (ref 0–0.5)
LYMPHOCYTES # BLD: 1.4 K/UL (ref 0.8–3.5)
LYMPHOCYTES NFR BLD: 13 % (ref 12–49)
MAGNESIUM SERPL-MCNC: 2.4 MG/DL (ref 1.6–2.4)
MCH RBC QN AUTO: 31.7 PG (ref 26–34)
MCHC RBC AUTO-ENTMCNC: 32.9 G/DL (ref 30–36.5)
MCV RBC AUTO: 96.4 FL (ref 80–99)
MONOCYTES # BLD: 1 K/UL (ref 0–1)
MONOCYTES NFR BLD: 9 % (ref 5–13)
NEUTS SEG # BLD: 8 K/UL (ref 1.8–8)
NEUTS SEG NFR BLD: 76 % (ref 32–75)
NRBC # BLD: 0 K/UL (ref 0–0.01)
NRBC BLD-RTO: 0 PER 100 WBC
PHOSPHATE SERPL-MCNC: 2.3 MG/DL (ref 2.6–4.7)
PLATELET # BLD AUTO: 265 K/UL (ref 150–400)
PMV BLD AUTO: 10.5 FL (ref 8.9–12.9)
POTASSIUM SERPL-SCNC: 3.8 MMOL/L (ref 3.5–5.1)
PROT SERPL-MCNC: 6.2 G/DL (ref 6.4–8.2)
RBC # BLD AUTO: 4.19 M/UL (ref 3.8–5.2)
SERVICE CMNT-IMP: NORMAL
SODIUM SERPL-SCNC: 145 MMOL/L (ref 136–145)
WBC # BLD AUTO: 10.5 K/UL (ref 3.6–11)

## 2024-11-24 PROCEDURE — 2060000000 HC ICU INTERMEDIATE R&B

## 2024-11-24 PROCEDURE — 83735 ASSAY OF MAGNESIUM: CPT

## 2024-11-24 PROCEDURE — 2580000003 HC RX 258: Performed by: HOSPITALIST

## 2024-11-24 PROCEDURE — 94760 N-INVAS EAR/PLS OXIMETRY 1: CPT

## 2024-11-24 PROCEDURE — 84100 ASSAY OF PHOSPHORUS: CPT

## 2024-11-24 PROCEDURE — 92610 EVALUATE SWALLOWING FUNCTION: CPT

## 2024-11-24 PROCEDURE — 6370000000 HC RX 637 (ALT 250 FOR IP): Performed by: HOSPITALIST

## 2024-11-24 PROCEDURE — 97550 CAREGIVER TRAING 1ST 30 MIN: CPT

## 2024-11-24 PROCEDURE — 6360000002 HC RX W HCPCS: Performed by: HOSPITALIST

## 2024-11-24 PROCEDURE — 6360000002 HC RX W HCPCS: Performed by: STUDENT IN AN ORGANIZED HEALTH CARE EDUCATION/TRAINING PROGRAM

## 2024-11-24 PROCEDURE — 80053 COMPREHEN METABOLIC PANEL: CPT

## 2024-11-24 PROCEDURE — 2580000003 HC RX 258: Performed by: STUDENT IN AN ORGANIZED HEALTH CARE EDUCATION/TRAINING PROGRAM

## 2024-11-24 PROCEDURE — 85025 COMPLETE CBC W/AUTO DIFF WBC: CPT

## 2024-11-24 PROCEDURE — 36415 COLL VENOUS BLD VENIPUNCTURE: CPT

## 2024-11-24 RX ORDER — PROPRANOLOL HYDROCHLORIDE 60 MG/1
60 CAPSULE, EXTENDED RELEASE ORAL DAILY
Status: DISCONTINUED | OUTPATIENT
Start: 2024-11-24 | End: 2024-11-27 | Stop reason: HOSPADM

## 2024-11-24 RX ORDER — ATORVASTATIN CALCIUM 20 MG/1
20 TABLET, FILM COATED ORAL DAILY
Status: DISCONTINUED | OUTPATIENT
Start: 2024-11-24 | End: 2024-11-27 | Stop reason: HOSPADM

## 2024-11-24 RX ORDER — PRIMIDONE 50 MG/1
150 TABLET ORAL 2 TIMES DAILY
Status: DISCONTINUED | OUTPATIENT
Start: 2024-11-24 | End: 2024-11-27 | Stop reason: HOSPADM

## 2024-11-24 RX ADMIN — PRIMIDONE 150 MG: 50 TABLET ORAL at 20:50

## 2024-11-24 RX ADMIN — PANTOPRAZOLE SODIUM 40 MG: 40 INJECTION, POWDER, FOR SOLUTION INTRAVENOUS at 20:50

## 2024-11-24 RX ADMIN — CEFEPIME 2000 MG: 2 INJECTION, POWDER, FOR SOLUTION INTRAVENOUS at 14:32

## 2024-11-24 RX ADMIN — PANTOPRAZOLE SODIUM 40 MG: 40 INJECTION, POWDER, FOR SOLUTION INTRAVENOUS at 09:41

## 2024-11-24 RX ADMIN — SODIUM CHLORIDE, PRESERVATIVE FREE 10 ML: 5 INJECTION INTRAVENOUS at 09:41

## 2024-11-24 RX ADMIN — HEPARIN SODIUM 5000 UNITS: 5000 INJECTION INTRAVENOUS; SUBCUTANEOUS at 14:30

## 2024-11-24 RX ADMIN — CEFEPIME 2000 MG: 2 INJECTION, POWDER, FOR SOLUTION INTRAVENOUS at 02:52

## 2024-11-24 RX ADMIN — HEPARIN SODIUM 5000 UNITS: 5000 INJECTION INTRAVENOUS; SUBCUTANEOUS at 06:55

## 2024-11-24 RX ADMIN — SODIUM CHLORIDE, PRESERVATIVE FREE 10 ML: 5 INJECTION INTRAVENOUS at 20:52

## 2024-11-24 RX ADMIN — PROPRANOLOL HYDROCHLORIDE 60 MG: 60 CAPSULE, EXTENDED RELEASE ORAL at 14:31

## 2024-11-24 RX ADMIN — PRIMIDONE 150 MG: 50 TABLET ORAL at 14:28

## 2024-11-24 RX ADMIN — HEPARIN SODIUM 5000 UNITS: 5000 INJECTION INTRAVENOUS; SUBCUTANEOUS at 22:42

## 2024-11-24 RX ADMIN — ATORVASTATIN CALCIUM 20 MG: 20 TABLET, FILM COATED ORAL at 14:29

## 2024-11-24 ASSESSMENT — PAIN SCALES - GENERAL: PAINLEVEL_OUTOF10: 0

## 2024-11-25 LAB
ANION GAP SERPL CALC-SCNC: 5 MMOL/L (ref 2–12)
BUN SERPL-MCNC: 22 MG/DL (ref 6–20)
BUN/CREAT SERPL: 27 (ref 12–20)
CALCIUM SERPL-MCNC: 8.4 MG/DL (ref 8.5–10.1)
CHLORIDE SERPL-SCNC: 112 MMOL/L (ref 97–108)
CO2 SERPL-SCNC: 25 MMOL/L (ref 21–32)
CREAT SERPL-MCNC: 0.83 MG/DL (ref 0.55–1.02)
GLUCOSE SERPL-MCNC: 96 MG/DL (ref 65–100)
MAGNESIUM SERPL-MCNC: 2.2 MG/DL (ref 1.6–2.4)
PHOSPHATE SERPL-MCNC: 1.7 MG/DL (ref 2.6–4.7)
POTASSIUM SERPL-SCNC: 3.7 MMOL/L (ref 3.5–5.1)
SODIUM SERPL-SCNC: 142 MMOL/L (ref 136–145)

## 2024-11-25 PROCEDURE — 80048 BASIC METABOLIC PNL TOTAL CA: CPT

## 2024-11-25 PROCEDURE — 2580000003 HC RX 258: Performed by: HOSPITALIST

## 2024-11-25 PROCEDURE — 6370000000 HC RX 637 (ALT 250 FOR IP): Performed by: HOSPITALIST

## 2024-11-25 PROCEDURE — 6360000002 HC RX W HCPCS: Performed by: STUDENT IN AN ORGANIZED HEALTH CARE EDUCATION/TRAINING PROGRAM

## 2024-11-25 PROCEDURE — 1100000000 HC RM PRIVATE

## 2024-11-25 PROCEDURE — 6360000002 HC RX W HCPCS: Performed by: HOSPITALIST

## 2024-11-25 PROCEDURE — 2580000003 HC RX 258: Performed by: STUDENT IN AN ORGANIZED HEALTH CARE EDUCATION/TRAINING PROGRAM

## 2024-11-25 PROCEDURE — 84100 ASSAY OF PHOSPHORUS: CPT

## 2024-11-25 PROCEDURE — 83735 ASSAY OF MAGNESIUM: CPT

## 2024-11-25 RX ORDER — AMOXICILLIN AND CLAVULANATE POTASSIUM 400; 57 MG/5ML; MG/5ML
875 POWDER, FOR SUSPENSION ORAL EVERY 12 HOURS
Status: DISCONTINUED | OUTPATIENT
Start: 2024-11-25 | End: 2024-11-27 | Stop reason: HOSPADM

## 2024-11-25 RX ORDER — PANTOPRAZOLE SODIUM 40 MG/1
40 TABLET, DELAYED RELEASE ORAL
Status: DISCONTINUED | OUTPATIENT
Start: 2024-11-25 | End: 2024-11-27 | Stop reason: HOSPADM

## 2024-11-25 RX ADMIN — HEPARIN SODIUM 5000 UNITS: 5000 INJECTION INTRAVENOUS; SUBCUTANEOUS at 17:43

## 2024-11-25 RX ADMIN — PROPRANOLOL HYDROCHLORIDE 60 MG: 60 CAPSULE, EXTENDED RELEASE ORAL at 08:29

## 2024-11-25 RX ADMIN — AMOXICILLIN AND CLAVULANATE POTASSIUM 875 MG: 400; 57 POWDER, FOR SUSPENSION ORAL at 17:43

## 2024-11-25 RX ADMIN — POTASSIUM & SODIUM PHOSPHATES POWDER PACK 280-160-250 MG 500 MG: 280-160-250 PACK at 08:35

## 2024-11-25 RX ADMIN — PRIMIDONE 150 MG: 50 TABLET ORAL at 08:29

## 2024-11-25 RX ADMIN — CEFEPIME 2000 MG: 2 INJECTION, POWDER, FOR SOLUTION INTRAVENOUS at 02:38

## 2024-11-25 RX ADMIN — PANTOPRAZOLE SODIUM 40 MG: 40 INJECTION, POWDER, FOR SOLUTION INTRAVENOUS at 08:30

## 2024-11-25 RX ADMIN — SODIUM CHLORIDE, PRESERVATIVE FREE 10 ML: 5 INJECTION INTRAVENOUS at 20:32

## 2024-11-25 RX ADMIN — SODIUM CHLORIDE, PRESERVATIVE FREE 10 ML: 5 INJECTION INTRAVENOUS at 08:31

## 2024-11-25 RX ADMIN — ATORVASTATIN CALCIUM 20 MG: 20 TABLET, FILM COATED ORAL at 08:30

## 2024-11-25 RX ADMIN — HEPARIN SODIUM 5000 UNITS: 5000 INJECTION INTRAVENOUS; SUBCUTANEOUS at 22:59

## 2024-11-25 RX ADMIN — PRIMIDONE 150 MG: 50 TABLET ORAL at 20:32

## 2024-11-25 RX ADMIN — PANTOPRAZOLE SODIUM 40 MG: 40 TABLET, DELAYED RELEASE ORAL at 17:43

## 2024-11-25 RX ADMIN — HEPARIN SODIUM 5000 UNITS: 5000 INJECTION INTRAVENOUS; SUBCUTANEOUS at 06:33

## 2024-11-25 ASSESSMENT — PAIN SCALES - GENERAL: PAINLEVEL_OUTOF10: 0

## 2024-11-25 NOTE — WOUND CARE
Wound care consult for bilateral groin redness by nurse request.  Glanced at groin, no redness noted, staff has been using zinc oxide.  Would continue using zinc oxide as needed.    Spoke with Dr. Peterson, wound care orders obtained.    Wound care will sign off.    Recommendation:  Bilateral groin- Every 12 hours and as needed apply zinc oxide (orange tube).    Cris \"Gayle\" GIOVANNI Fair, RN, CWCN  Office x [phone]  Fasted way to contact me is [facility]

## 2024-11-25 NOTE — CONSULTS
[facility] [address] [address]        GASTROENTEROLOGY CONSULTATION NOTE  Will JACKSON Lara  [phone] office      NAME:  Marielle Godoy YOB: 1935   MRN:   [id number]       Referring Physician: Dr. Lele Peterson     Consult Date: 2024 9:39 AM     History of Present Illness:  Patient is a 89 y.o. who is seen in consultation at the request of Dr. Peterson for dysphagia, esophagitis.  Patient has a past medical history significant for dyslipidemia, GERD, essential tremors, and dementia.  She presented from [facility] after being found down in her room.  Patient was admitted to the hospital on 24 for dysphagia with suspected aspiration pneumonia, sepsis, dehydration, dementia, falls, elevated troponin, right renal cyst vs mass, and acute kidney injury.    History was obtained from the patient's family at the bedside.  History of episodic cough with associated mucus production.  Episodes have occurred around eating/drinking.  Most recent episode occurred approximately 2 weeks ago.  No daily symptoms.  There is associated gurgling/hiccup/burping at times during these episodes.  No nausea, vomiting, or abdominal pain.  No melena or hematochezia.  No fevers, chills, or unexplained weight loss.  Barium esophagram 2024 showed mild reflux with mildly diminished peristalsis; small sliding-type hiatal hernia.  EGD 2024 by Dr. Gale showed a 2 cm hiatal hernia, Schatzki's ring at the GE junction - dilated to 18 mm, rest of the esophagus was normal; normal stomach; normal duodenum.  Unknown benefit following EGD.     Patient is on heparin SQ.     I have reviewed the emergency room note, hospital admission note, notes by all other clinicians who have seen the patient during this hospitalization to date. I have reviewed the problem list and the reason for this hospitalization. I have reviewed the allergies and the medications the

## 2024-11-26 LAB
ANION GAP SERPL CALC-SCNC: 6 MMOL/L (ref 2–12)
BUN SERPL-MCNC: 17 MG/DL (ref 6–20)
BUN/CREAT SERPL: 25 (ref 12–20)
CALCIUM SERPL-MCNC: 9.2 MG/DL (ref 8.5–10.1)
CHLORIDE SERPL-SCNC: 108 MMOL/L (ref 97–108)
CO2 SERPL-SCNC: 27 MMOL/L (ref 21–32)
CREAT SERPL-MCNC: 0.69 MG/DL (ref 0.55–1.02)
ERYTHROCYTE [DISTWIDTH] IN BLOOD BY AUTOMATED COUNT: 12.2 % (ref 11.5–14.5)
GLUCOSE SERPL-MCNC: 110 MG/DL (ref 65–100)
HCT VFR BLD AUTO: 38.8 % (ref 35–47)
HGB BLD-MCNC: 12.8 G/DL (ref 11.5–16)
MCH RBC QN AUTO: 31.1 PG (ref 26–34)
MCHC RBC AUTO-ENTMCNC: 33 G/DL (ref 30–36.5)
MCV RBC AUTO: 94.4 FL (ref 80–99)
NRBC # BLD: 0 K/UL (ref 0–0.01)
NRBC BLD-RTO: 0 PER 100 WBC
PLATELET # BLD AUTO: 265 K/UL (ref 150–400)
PMV BLD AUTO: 11.1 FL (ref 8.9–12.9)
POTASSIUM SERPL-SCNC: 3.6 MMOL/L (ref 3.5–5.1)
RBC # BLD AUTO: 4.11 M/UL (ref 3.8–5.2)
SODIUM SERPL-SCNC: 141 MMOL/L (ref 136–145)
WBC # BLD AUTO: 8.9 K/UL (ref 3.6–11)

## 2024-11-26 PROCEDURE — 85027 COMPLETE CBC AUTOMATED: CPT

## 2024-11-26 PROCEDURE — 92526 ORAL FUNCTION THERAPY: CPT

## 2024-11-26 PROCEDURE — 1100000000 HC RM PRIVATE

## 2024-11-26 PROCEDURE — 6370000000 HC RX 637 (ALT 250 FOR IP): Performed by: HOSPITALIST

## 2024-11-26 PROCEDURE — 97530 THERAPEUTIC ACTIVITIES: CPT | Performed by: OCCUPATIONAL THERAPIST

## 2024-11-26 PROCEDURE — 6370000000 HC RX 637 (ALT 250 FOR IP): Performed by: STUDENT IN AN ORGANIZED HEALTH CARE EDUCATION/TRAINING PROGRAM

## 2024-11-26 PROCEDURE — 6360000002 HC RX W HCPCS: Performed by: STUDENT IN AN ORGANIZED HEALTH CARE EDUCATION/TRAINING PROGRAM

## 2024-11-26 PROCEDURE — 2580000003 HC RX 258: Performed by: STUDENT IN AN ORGANIZED HEALTH CARE EDUCATION/TRAINING PROGRAM

## 2024-11-26 PROCEDURE — 80048 BASIC METABOLIC PNL TOTAL CA: CPT

## 2024-11-26 PROCEDURE — 97535 SELF CARE MNGMENT TRAINING: CPT | Performed by: OCCUPATIONAL THERAPIST

## 2024-11-26 PROCEDURE — 97530 THERAPEUTIC ACTIVITIES: CPT

## 2024-11-26 RX ORDER — POLYETHYLENE GLYCOL 3350 17 G/17G
17 POWDER, FOR SOLUTION ORAL
Status: ACTIVE | OUTPATIENT
Start: 2024-11-26 | End: 2024-11-27

## 2024-11-26 RX ADMIN — POLYETHYLENE GLYCOL 3350 17 G: 17 POWDER, FOR SOLUTION ORAL at 12:59

## 2024-11-26 RX ADMIN — AMOXICILLIN AND CLAVULANATE POTASSIUM 875 MG: 400; 57 POWDER, FOR SUSPENSION ORAL at 05:47

## 2024-11-26 RX ADMIN — PANTOPRAZOLE SODIUM 40 MG: 40 TABLET, DELAYED RELEASE ORAL at 05:47

## 2024-11-26 RX ADMIN — SODIUM CHLORIDE, PRESERVATIVE FREE 10 ML: 5 INJECTION INTRAVENOUS at 20:32

## 2024-11-26 RX ADMIN — SODIUM CHLORIDE, PRESERVATIVE FREE 10 ML: 5 INJECTION INTRAVENOUS at 08:44

## 2024-11-26 RX ADMIN — AMOXICILLIN AND CLAVULANATE POTASSIUM 875 MG: 400; 57 POWDER, FOR SUSPENSION ORAL at 17:56

## 2024-11-26 RX ADMIN — HEPARIN SODIUM 5000 UNITS: 5000 INJECTION INTRAVENOUS; SUBCUTANEOUS at 22:37

## 2024-11-26 RX ADMIN — PANTOPRAZOLE SODIUM 40 MG: 40 TABLET, DELAYED RELEASE ORAL at 14:40

## 2024-11-26 RX ADMIN — PRIMIDONE 150 MG: 50 TABLET ORAL at 20:31

## 2024-11-26 RX ADMIN — PRIMIDONE 150 MG: 50 TABLET ORAL at 08:43

## 2024-11-26 RX ADMIN — HEPARIN SODIUM 5000 UNITS: 5000 INJECTION INTRAVENOUS; SUBCUTANEOUS at 14:40

## 2024-11-26 RX ADMIN — HEPARIN SODIUM 5000 UNITS: 5000 INJECTION INTRAVENOUS; SUBCUTANEOUS at 05:47

## 2024-11-26 RX ADMIN — PROPRANOLOL HYDROCHLORIDE 60 MG: 60 CAPSULE, EXTENDED RELEASE ORAL at 08:43

## 2024-11-26 RX ADMIN — ATORVASTATIN CALCIUM 20 MG: 20 TABLET, FILM COATED ORAL at 08:43

## 2024-11-26 NOTE — CARE COORDINATION
Plan: to SNF at [facility]; pending bed availability at [facility]; the bed will be ready on Wednesday 11/27  (CM to call Pearl in admissions- see number below) (no insurance auth is needed) pt has had her 3 night medicare stay  Pt is currently living in [facility]. Pt's family feels that pt is no longer able to safely live independently due to dementia and functional needs.    [facility] Liaison: is Pearl- [phone]  and [phone]       Transport Plan: likely stretcher (not set up yet)     RUR: 11%    Main contact is pts son- Montana Godoy- [phone]    1000: this CM contacted [place] at [facility] to f/u on the bed availability; waiting on answer  Prior Level of Functioning: [place] in [place] at [facility]; uses a rollator; needs their AL section    1110: this CM called Pearl at [facility] to f/u on the bed availability; no answer; left VM for callback      1151: this CM received call from Pearl at [facility]- the bed will be ready on 11/27; this CM updated the attending by perfectserve message; he verbalized understanding    1200: this Cm met with pt and her son at bedside to update them that the bed at [facility] will be available on 11/27; they verbalized understanding    Disposition: to the health care unit at [facility]  KEILA: 24 hours   If SNF or IPR: Date FOC offered: n/a  Date FOC received: n/a  Accepting facility: [facility]  Date authorization started with reference number: not needed  Date authorization received and expires: n/a  Follow up appointments: specialists/pcp  DME needed: none ordered if going to SNF  Transportation at discharge: needs stretcher   IM/IMM Medicare/ letter given: 1st on 11/22  Is patient a  and connected with VA? no   If yes, was  transfer form completed and VA notified? N/a  Caregiver Contact: pts son  Discharge Caregiver contacted prior to discharge? At bedside on 11/26 with update  Care Conference needed? no  Barriers to

## 2024-11-27 VITALS
DIASTOLIC BLOOD PRESSURE: 52 MMHG | OXYGEN SATURATION: 95 % | HEIGHT: 65 IN | SYSTOLIC BLOOD PRESSURE: 109 MMHG | WEIGHT: 141.2 LBS | BODY MASS INDEX: 23.53 KG/M2 | TEMPERATURE: 98.2 F | RESPIRATION RATE: 16 BRPM | HEART RATE: 66 BPM

## 2024-11-27 LAB
BACTERIA SPEC CULT: NORMAL
BACTERIA SPEC CULT: NORMAL
SERVICE CMNT-IMP: NORMAL
SERVICE CMNT-IMP: NORMAL

## 2024-11-27 PROCEDURE — 6360000002 HC RX W HCPCS: Performed by: STUDENT IN AN ORGANIZED HEALTH CARE EDUCATION/TRAINING PROGRAM

## 2024-11-27 PROCEDURE — 92526 ORAL FUNCTION THERAPY: CPT

## 2024-11-27 PROCEDURE — 6370000000 HC RX 637 (ALT 250 FOR IP): Performed by: HOSPITALIST

## 2024-11-27 PROCEDURE — 97550 CAREGIVER TRAING 1ST 30 MIN: CPT

## 2024-11-27 PROCEDURE — 97530 THERAPEUTIC ACTIVITIES: CPT

## 2024-11-27 PROCEDURE — 2580000003 HC RX 258: Performed by: STUDENT IN AN ORGANIZED HEALTH CARE EDUCATION/TRAINING PROGRAM

## 2024-11-27 RX ORDER — PANTOPRAZOLE SODIUM 40 MG/1
40 TABLET, DELAYED RELEASE ORAL
Qty: 60 TABLET | Refills: 0 | Status: SHIPPED
Start: 2024-11-27 | End: 2024-12-27

## 2024-11-27 RX ADMIN — AMOXICILLIN AND CLAVULANATE POTASSIUM 875 MG: 400; 57 POWDER, FOR SUSPENSION ORAL at 07:09

## 2024-11-27 RX ADMIN — SODIUM CHLORIDE, PRESERVATIVE FREE 10 ML: 5 INJECTION INTRAVENOUS at 09:10

## 2024-11-27 RX ADMIN — HEPARIN SODIUM 5000 UNITS: 5000 INJECTION INTRAVENOUS; SUBCUTANEOUS at 06:30

## 2024-11-27 RX ADMIN — PANTOPRAZOLE SODIUM 40 MG: 40 TABLET, DELAYED RELEASE ORAL at 07:10

## 2024-11-27 RX ADMIN — PROPRANOLOL HYDROCHLORIDE 60 MG: 60 CAPSULE, EXTENDED RELEASE ORAL at 09:09

## 2024-11-27 RX ADMIN — PRIMIDONE 150 MG: 50 TABLET ORAL at 09:08

## 2024-11-27 RX ADMIN — ATORVASTATIN CALCIUM 20 MG: 20 TABLET, FILM COATED ORAL at 09:09

## 2024-11-27 ASSESSMENT — PAIN SCALES - GENERAL: PAINLEVEL_OUTOF10: 0

## 2024-11-27 NOTE — PLAN OF CARE
Problem: Physical Therapy - Adult  Goal: By Discharge: Performs mobility at highest level of function for planned discharge setting.  See evaluation for individualized goals.  Description: FUNCTIONAL STATUS PRIOR TO ADMISSION: Patient was modified independent and active with use of a rollator. Patient was showering independently at her [facility] in [place]. Patient eating cereal for breakfast, skipping lunch, and ambulating to dining [place] for dinner. The family endorses x 1 fall within the last week (reason for admission). Hx of dementia and upper body tremors.     HOME SUPPORT PRIOR TO ADMISSION: The patient resides at [facility] (independent living).    Physical Therapy Goals  Initiated 11/23/2024  1.  Patient will move from supine to sit and sit to supine, scoot up and down, and roll side to side in bed with minimal assistance within 7 day(s).    2.  Patient will perform sit to stand with minimal assistance within 7 day(s).  3.  Patient will transfer from bed to chair and chair to bed with minimal assistance using the least restrictive device within 7 day(s).  4.  Patient will ambulate with minimal assistance for 25 - 50 feet with the least restrictive device within 7 day(s).     Outcome: Progressing   PHYSICAL THERAPY TREATMENT    Patient: Marielle Godoy (89 y.o. female)  Date: 11/27/2024  Diagnosis: Elevated troponin [R79.89]  Elevated CK [R74.8]  Fall, initial encounter [W19.XXXA]  Sepsis (HCC) [A41.9]  Leukocytosis, unspecified type [D72.829] Sepsis (HCC)      Precautions: Fall Risk                      ASSESSMENT:  Patient continues to benefit from skilled PT services and is progressing towards goals. Patient required increased time and cues to become more alert. Pt presented with increased anterior leaning upon standing and able to take several steps to chair. Noted difficulty performing turn and required assistance to control descent into chair. Anticipate need for increased physical assistance upon discharge as pt 
  Problem: Physical Therapy - Adult  Goal: By Discharge: Performs mobility at highest level of function for planned discharge setting.  See evaluation for individualized goals.  Description: FUNCTIONAL STATUS PRIOR TO ADMISSION: Patient was modified independent and active with use of a rollator. Patient was showering independently at her hospitals. Patient eating cereal for breakfast, skipping lunch, and ambulating to dining [place] for dinner. The family endorses x 1 fall within the last week (reason for admission). Hx of dementia and upper body tremors.     HOME SUPPORT PRIOR TO ADMISSION: The patient resides at [facility] (independent living).    Physical Therapy Goals  Initiated 11/23/2024  1.  Patient will move from supine to sit and sit to supine, scoot up and down, and roll side to side in bed with minimal assistance within 7 day(s).    2.  Patient will perform sit to stand with minimal assistance within 7 day(s).  3.  Patient will transfer from bed to chair and chair to bed with minimal assistance using the least restrictive device within 7 day(s).  4.  Patient will ambulate with minimal assistance for 25 - 50 feet with the least restrictive device within 7 day(s).     Outcome: Progressing     PHYSICAL THERAPY EVALUATION    Patient: Marielle Godoy (89 y.o. female)  Date: 11/23/2024  Primary Diagnosis: Elevated troponin [R79.89]  Elevated CK [R74.8]  Fall, initial encounter [W19.XXXA]  Sepsis (HCC) [A41.9]  Leukocytosis, unspecified type [D72.829]       Precautions: Restrictions/Precautions: Fall Risk, Bed/Chair Alarm (Hx of Dementia)      ASSESSMENT :   DEFICITS/IMPAIRMENTS:   The patient is limited by decreased functional mobility, independence in ADLs, strength, activity tolerance, endurance, safety awareness, cognition, command following, attention/concentration, coordination, balance, and fine-motor control. Patient's unwitnessed fall lead to this admission.     Patient's family present at bedside for evaluation 
  Problem: Safety - Adult  Goal: Free from fall injury  11/23/2024 1048 by Merna Zavala, RN  Outcome: /[facility] Progressing  11/23/2024 0255 by Maribel Garcia RN  Outcome: Progressing  Flowsheets  Taken 11/23/2024 0255  Free From Fall Injury: Instruct family/caregiver on patient safety  Taken 11/23/2024 0000  Free From Fall Injury:   Instruct family/caregiver on patient safety   Based on caregiver fall risk screen, instruct family/caregiver to ask for assistance with transferring infant if caregiver noted to have fall risk factors     Problem: Discharge Planning  Goal: Discharge to home or other facility with appropriate resources  11/23/2024 1048 by Merna Zavala, RN  Outcome: /[facility] Progressing  11/23/2024 0255 by Maribel Garcia RN  Outcome: Progressing  Flowsheets  Taken 11/23/2024 0255  Discharge to home or other facility with appropriate resources:   Identify barriers to discharge with patient and caregiver   Arrange for needed discharge resources and transportation as appropriate   Identify discharge learning needs (meds, wound care, etc)   Refer to discharge planning if patient needs post-hospital services based on physician order or complex needs related to functional status, cognitive ability or social support system  Taken 11/23/2024 0000  Discharge to home or other facility with appropriate resources:   Identify barriers to discharge with patient and caregiver   Arrange for needed discharge resources and transportation as appropriate   Identify discharge learning needs (meds, wound care, etc)   Refer to discharge planning if patient needs post-hospital services based on physician order or complex needs related to functional status, cognitive ability or social support system     Problem: Skin/Tissue Integrity  Goal: Absence of new skin breakdown  Description: 1.  Monitor for areas of redness and/or skin breakdown  2.  Assess vascular access sites hourly  3.  Every 4-6 hours minimum:  Change 
  Problem: Safety - Adult  Goal: Free from fall injury  11/24/2024 0826 by Merna Zavala RN  Outcome: /[facility] Progressing  11/24/2024 0532 by Maribel Garcia RN  Outcome: Progressing  Flowsheets (Taken 11/24/2024 0532)  Free From Fall Injury: Instruct family/caregiver on patient safety     Problem: Discharge Planning  Goal: Discharge to home or other facility with appropriate resources  11/24/2024 0826 by Merna Zavala RN  Outcome: /[facility] Progressing  11/24/2024 0532 by Maribel Garcia RN  Outcome: Progressing  Flowsheets (Taken 11/24/2024 0532)  Discharge to home or other facility with appropriate resources:   Identify barriers to discharge with patient and caregiver   Arrange for needed discharge resources and transportation as appropriate   Identify discharge learning needs (meds, wound care, etc)   Refer to discharge planning if patient needs post-hospital services based on physician order or complex needs related to functional status, cognitive ability or social support system     Problem: Skin/Tissue Integrity  Goal: Absence of new skin breakdown  Description: 1.  Monitor for areas of redness and/or skin breakdown  2.  Assess vascular access sites hourly  3.  Every 4-6 hours minimum:  Change oxygen saturation probe site  4.  Every 4-6 hours:  If on nasal continuous positive airway pressure, respiratory therapy assess nares and determine need for appliance change or resting period.  Outcome: /[facility] Progressing     Problem: ABCDS Injury Assessment  Goal: Absence of physical injury  Outcome: [facility] Progressing     Problem: Confusion  Goal: Confusion, delirium, dementia, or psychosis is improved or at baseline  Description: INTERVENTIONS:  1. Assess for possible contributors to thought disturbance, including medications, impaired vision or hearing, underlying metabolic abnormalities, dehydration, psychiatric diagnoses, and notify attending LIP  2. [place] high risk fall precautions, as 
  Problem: Safety - Adult  Goal: Free from fall injury  11/24/2024 2306 by Maribel Garcia RN  Outcome: Progressing  Flowsheets (Taken 11/24/2024 2306)  Free From Fall Injury: Instruct family/caregiver on patient safety     Problem: Discharge Planning  Goal: Discharge to home or other facility with appropriate resources  11/24/2024 2306 by Maribel Garcia RN  Outcome: Progressing  Flowsheets (Taken 11/24/2024 2306)  Discharge to home or other facility with appropriate resources:   Identify barriers to discharge with patient and caregiver   Arrange for needed discharge resources and transportation as appropriate   Identify discharge learning needs (meds, wound care, etc)   Refer to discharge planning if patient needs post-hospital services based on physician order or complex needs related to functional status, cognitive ability or social support system     Problem: Confusion  Goal: Confusion, delirium, dementia, or psychosis is improved or at baseline  Description: INTERVENTIONS:  1. Assess for possible contributors to thought disturbance, including medications, impaired vision or hearing, underlying metabolic abnormalities, dehydration, psychiatric diagnoses, and notify attending LIP  2. [place] high risk fall precautions, as indicated  3. Provide frequent short contacts to provide reality reorientation, refocusing and direction  4. Decrease environmental stimuli, including noise as appropriate  5. Monitor and intervene to maintain adequate nutrition, hydration, elimination, sleep and activity  6. If unable to ensure safety without constant attention obtain sitter and review sitter guidelines with assigned personnel  7. Initiate Psychosocial CNS and Spiritual Care consult, as indicated  Outcome: Progressing  Flowsheets (Taken 11/24/2024 2306)  Effect of thought disturbance (confusion, delirium, dementia, or psychosis) are managed with adequate functional status:   Assess for contributors to thought 
  Problem: Safety - Adult  Goal: Free from fall injury  Outcome: Progressing  Flowsheets  Taken 11/23/2024 0255  Free From Fall Injury: Instruct family/caregiver on patient safety  Taken 11/23/2024 0000  Free From Fall Injury:   Instruct family/caregiver on patient safety   Based on caregiver fall risk screen, instruct family/caregiver to ask for assistance with transferring infant if caregiver noted to have fall risk factors     Problem: Discharge Planning  Goal: Discharge to home or other facility with appropriate resources  Outcome: Progressing  Flowsheets  Taken 11/23/2024 0255  Discharge to home or other facility with appropriate resources:   Identify barriers to discharge with patient and caregiver   Arrange for needed discharge resources and transportation as appropriate   Identify discharge learning needs (meds, wound care, etc)   Refer to discharge planning if patient needs post-hospital services based on physician order or complex needs related to functional status, cognitive ability or social support system  Taken 11/23/2024 0000  Discharge to home or other facility with appropriate resources:   Identify barriers to discharge with patient and caregiver   Arrange for needed discharge resources and transportation as appropriate   Identify discharge learning needs (meds, wound care, etc)   Refer to discharge planning if patient needs post-hospital services based on physician order or complex needs related to functional status, cognitive ability or social support system     Problem: Confusion  Goal: Confusion, delirium, dementia, or psychosis is improved or at baseline  Description: INTERVENTIONS:  1. Assess for possible contributors to thought disturbance, including medications, impaired vision or hearing, underlying metabolic abnormalities, dehydration, psychiatric diagnoses, and notify attending LIP  2. [place] high risk fall precautions, as indicated  3. Provide frequent short contacts to provide 
Speech LAnguage Pathology TREATMENT    Patient: Marielle Godoy (89 y.o. female)  Date: 11/26/2024  Primary Diagnosis: Elevated troponin [R79.89]  Elevated CK [R74.8]  Fall, initial encounter [W19.XXXA]  Sepsis (HCC) [A41.9]  Leukocytosis, unspecified type [D72.829]       Precautions:  Fall Risk                  ASSESSMENT :  Patient seen for swallow treatment. Patient tolerating diet without any changes in respiratory status nor concerns for adverse effects. Patient's son at bedside reports that the strategies provided by SLP on Sunday have seemed to help significantly, but expressed concern about this being followed upon discharge. SLP encouraged pt's son to bring the handouts provided to him on Sunday to the facility and explain the importance of utilizing these strategies, particularly to avoid any further admissions for respiratory distress.     Patient's son also stated that he was worried about her cognition because she had an episode that really demonstrated her dementia. Provided [place] with strategies, including playing music from the patient's teenage years/20s. He expressed understanding. Will continue to follow.     Patient will benefit from skilled intervention to address the above impairments.     PLAN :  Recommendations and Planned Interventions:  Diet: Regular and thin liquids  Strict esophageal/reflux precautions  Good oral care  Upright during meals  Alternate liquids/solids  Small sips/bites  Assistance feeding and following precautions          Acute SLP Services: Yes, SLP will continue to follow per plan of care.  Discharge Recommendations: Yes, recommend SLP treatment at next level of care     SUBJECTIVE:   Patient stated, “Well, I'd look better if I could get my hair done.”    OBJECTIVE:     Past Medical History:   Diagnosis Date    Breast cancer (HCC)     Dementia (HCC)     Dysphagia     Essential tremor     Hyperlipidemia     Melanoma (HCC)      Past Surgical History:   Procedure Laterality Date 
abnormalities, dehydration, psychiatric diagnoses, notify [facility] high risk fall precautions, as indicated   Provide frequent short contacts to provide reality reorientation, refocusing and direction   Decrease environmental stimuli, including noise as appropriate   Monitor and intervene to maintain adequate nutrition, hydration, elimination, sleep and activity   If unable to ensure safety without constant attention obtain sitter and review sitter guidelines with assigned personnel  Taken 11/23/2024 1940  Effect of thought disturbance (confusion, delirium, dementia, or psychosis) are managed with adequate functional status:   Assess for contributors to thought disturbance, including medications, impaired vision or hearing, underlying metabolic abnormalities, dehydration, psychiatric diagnoses, notify [facility] high risk fall precautions, as indicated   Provide frequent short contacts to provide reality reorientation, refocusing and direction   Decrease environmental stimuli, including noise as appropriate   Monitor and intervene to maintain adequate nutrition, hydration, elimination, sleep and activity     
session. Patient completed transfer to EOB with x2 assist, patient extremely anxious around mobility/transfers as she feels she is much weaker than baseline. Sitting EOB, required constant assist to maintain balance d/t posterolateral lean. Completed x2 trials of standing requiring Mod Ax2 as patient with strong posterior lean when upright and increased tremors in BUE/BLE during transfers (reports as baseline). Returned to bedlevel, left with all needs in reach, NAD.     Functional Outcome Measure:  AM-PAC Inpatient Daily Activity Raw Score: 15 /24       PLAN :  Recommendations and Planned Interventions:   self care training, therapeutic activities, functional mobility training, balance training, therapeutic exercise, endurance activities, patient education, home safety training, and family training/education    Frequency/Duration: OT Plan of Care: 5 times/week    Recommendation for discharge: (in order for the patient to meet his/her long term goals): Moderate intensity short-term skilled occupational therapy up to 5x/week    Other factors to consider for discharge: impaired cognition, high risk for falls, and concern for safely navigating or managing the home environment    IF patient discharges home will need the following DME: continuing to assess with progress       SUBJECTIVE:   Patient stated, “I'll give it a try.”    OBJECTIVE DATA SUMMARY:     Past Medical History:   Diagnosis Date    Breast cancer (HCC)     Dementia (HCC)     Dysphagia     Essential tremor     Hyperlipidemia     Melanoma (HCC)      Past Surgical History:   Procedure Laterality Date    BREAST SURGERY      lumpectomy    HYSTERECTOMY (CERVIX STATUS UNKNOWN)      LYMPHADENECTOMY      UPPER GASTROINTESTINAL ENDOSCOPY N/A 8/12/2024    ESOPHAGOGASTRODUODENOSCOPY performed by Noe Gale MD at [facility] ENDOSCOPY       Expanded or extensive additional review of patient history:      Type of Home:  (Lives in [facility])              Bathroom 
assistance;Assist X2  Stand Pivot Transfers: Moderate assistance;Assist X2;Additional time (bed to BSC and BSC to chair stand pivot with RW, assist for balance/ walker mgmt, cues for sequence/ safety)  Balance:  Balance  Sitting: Impaired  Sitting - Static: Fair (occasional);Good (unsupported)  Sitting - Dynamic: Fair (occasional)  Standing: Impaired  Standing - Static: Constant support;Fair;Poor (stooped posture with posterior lean)  Standing - Dynamic: Constant support;Poor   Ambulation/Gait Training:     Gait  Gait Training:  (2-3 steps forward and to side with RW mod A x 2)          Pain Rating:  Pt reported one episode of pain with touch to L foot/ toes; unable to further localize, unable to reproduce  Pain Intervention(s):   rest    Activity Tolerance:   Good and requires rest breaks    After treatment:   Patient left in no apparent distress sitting up in chair, Call bell within reach, Bed/ chair alarm activated, Caregiver / family present, and Updated patient's board on functional status and mobility recommendations      COMMUNICATION/EDUCATION:   The patient's plan of care was discussed with: registered nurse    Patient Education  Education Given To: Patient  Education Provided: Role of Therapy;Plan of Care;Equipment;Fall Prevention Strategies;Transfer Training  Education Method: Verbal  Barriers to Learning: Cognition  Education Outcome: Continued education needed      Makenna Francis PT  Minutes: 50

## 2024-11-27 NOTE — DISCHARGE SUMMARY
Discharge Summary       PATIENT ID: Marielle Godoy  MRN: [id number]   YOB: 1935    DATE OF ADMISSION: 11/22/2024  1:09 PM    DATE OF DISCHARGE: 11/27/2024   PRIMARY CARE PROVIDER: Isatu Anna APRN - NP     ATTENDING PHYSICIAN: Kristen  DISCHARGING PROVIDER: Lele Peterson MD    To contact this individual call [phone] and ask the  to page.  If unavailable ask to be transferred the Adult Hospitalist Department.    CONSULTATIONS: IP CONSULT TO CASE MANAGEMENT  IP CONSULT TO GI    PROCEDURES/SURGERIES: * No surgery found *     ADMITTING DIAGNOSES & HOSPITAL COURSE:   Aspiration pneumonia  Esophageal dysphagia  Dehydration  VICKI  Dementia  Tremors  R renal cyst vs mass      89 y.o. female with pmh of dementia, essential tremors, dyslipidmeia, gerd who presented to ed from [facility] for evaluation after being found down in her room. Per chart review, pt did not check in with staff last morning and a welfare check was performed. She was reportedly found on her bathroom floor and brought to ed for evaluation.    Work-up showed RLL mucous plugging on CT as well as esophagitis, evidence of aspiration. Speech eval - normal swallow function, suspected esophageal dysphagia. Pt had an EGD and esophagram as outpt, noted Schatzki ring and hiatal hernia. GI consulted, rec increasing PPI to BID.    Incidental R renal lesion - would not pursue work-up, d/w family. Tubular structure on CT chest, consider repeat CT scan in a month.    DISCHARGE DIAGNOSES / PLAN:        FOLLOW UP APPOINTMENTS:    Follow-up Information       Follow up With Specialties Details Why Contact Info    Isatu Anna APRN - NP Nurse Practitioner Follow up  [address] [place] [id number] [phone]                DISCHARGE MEDICATIONS:     Medication List        CHANGE how you take these medications      pantoprazole 40 MG tablet  Commonly known as: PROTONIX  Take 1 tablet by mouth 2

## 2024-11-27 NOTE — PROGRESS NOTES
[facility]                                                                                          Hospitalist Progress Note  Lele Peterson MD  Office Phone: [phone]        Date of Service:  2024  NAME:  Marielle GARZA:  1935  MRN:  [id number]       Admission Summary:   89 y.o. female with pmh of dementia, essential tremors, dyslipidmeia, gerd who presented to ed from [facility] for evaluation after being found down in her room. Per chart review, pt did not check in with staff last morning and a welfare check was performed.  She was reportedly found on her bathroom floor and brought to ed for evaluation.       Interval history / Subjective:   Doing better no new complaints, tolerating diet     Assessment & Plan:     Dysphagia with suspected aspiration pneumonia  Sepsis syndrome  Dehydration  -RLL mucous plugging on CT  -changed abx to Augmentin  -wean O2 as tolerated -> on RA  -follow-up cultures NGTD  -speech eval  -with hx of dementia and chronic/progressive nature discussed possibility of not being able to maintain nutrition/hydration safely, potential need/option for a PEG which I advised against given her advanced age and comorbidities  -not clinically c/w cholecystitis  -tubular structure in R lung noted on CT chest - recommend repeat imaging as outpt once infection resolves    Dementia  Falls  -PT/OT eval  -currently in independent living, needs higher level of care on dc     Elevated troponin  -no CP, +dehydration, no further work-up    R renal cyst vs mass  -d/w family, will not pursue work-up at this time given other issues    VICKI: resolved w/ IVF    Tremors:  -continue primidone and propranolol as able    Dyslipidemia  -continue       Code status: DNR  Prophylaxis: sc Lovenox  Care Plan discussed with: pt's henry saldivar cm  Anticipated Disposition: SNF when bed available  Inpatient  Cardiac monitoring: 
        [facility]                                                                                          Hospitalist Progress Note  Lele Peterson MD  Office Phone: [phone]        Date of Service:  2024  NAME:  Marielle GARZA:  1935  MRN:  [id number]       Admission Summary:   89 y.o. female with pmh of dementia, essential tremors, dyslipidmeia, gerd who presented to ed from [facility] for evaluation after being found down in her room. Per chart review, pt did not check in with staff last morning and a welfare check was performed.  She was reportedly found on her bathroom floor and brought to ed for evaluation.       Interval history / Subjective:   Doing better no new complaints, tolerating diet     Assessment & Plan:     Dysphagia with suspected aspiration pneumonia  Sepsis syndrome  Dehydration  -RLL mucous plugging on CT  -change abx to Augmentin  -wean O2 as tolerated -> on RA  -follow-up cultures  -speech eval  -IVF can d/c  -with hx of dementia and chronic/progressive nature discussed possibility of not being able to maintain nutrition/hydration safely, potential need/option for a PEG which I advised against given her advanced age and comorbidities  -not clinically c/w cholecystitis  -tubular structure in R lung noted on CT chest - recommend repeat imaging as outpt once infection resolves    Dementia  Falls  -PT/OT eval  -currently in independent living, needs higher level of care on dc     Elevated troponin  -no CP, +dehydration, no further work-up    R renal cyst vs mass  -d/w family, will not pursue work-up at this time given other issues    VICKI: resolved w/ IVF    Tremors:  -continue primidone and propranolol as able    Dyslipidemia  -continue       Code status: DNR  Prophylaxis: sc Lovenox  Care Plan discussed with: pt's henry rn cm  Anticipated Disposition: SNF likely tomorrow  Inpatient  Cardiac monitoring: 
0811 Notified MD Peterson DNR forms was filled out incorrectly will need form to be updated.     11:52 Notified MD Peterson about form needing to be corrected. And if he has time to come back to bedside before son leaves to fill it out.   
1216: [employer] Message sent to attending physician, MD Kristen with the following message: PT is scheduled for transfer @ 1315 today. If you agree, could you please put in a DC order? Thank you!  Response: See Orders    Per MD order, PT received AVS documentation for discharge (DC). PT A&O x 2 at time of discharge (baseline). Provided PT and PT's son education on medications, facility, follow-up appointments and s/sx of infection. Allowed time for questions and PT & PT's son verbalized understanding. All lines were removed (intact) prior to DC. PT was escorted to facility by [facility] transportation service.   
1930 - Assumed care of the patient from previous RN.    1940 - Initial shift assessment completed.  Pt A&O x 2, VSS, rates pain 0/10, orders reviewed, telemetry intact, PIV site(s) intact and saline locked, whiteboard updated with POC for the shift, education provided on all scheduled medications for the shift, all precautions and safety interventions in place. Verified bed alarm on/audible, patient oriented to room/call system, and was encouraged to use the call light for any and all assistance.     No acute changes overnight.    Plan of care ongoing.  
1930 - Assumed care of the patient from previous RN.    2000 - Initial shift assessment completed.  Pt A&O x 2, VSS, rates pain 0/10, orders reviewed, telemetry intact, PIV site(s) intact and saline locked, whiteboard updated with POC for the shift, education provided on all scheduled medications for the shift, all precautions and safety interventions in place. Verified bed alarm on/audible, patient oriented to room/call system, and was encouraged to use the call light for any and all assistance.     No other acute changes overnight.    Plan of care ongoing.  
Advance Care Planning     Advance Care Planning Inpatient Note  [facility] Department    Today's Date: 11/24/2024  Unit: [facility] 4 IMCU    Received request from IDT Member to assist in completion of an incorrect DNR form.  explained that the authority for this rests with a physician and chaplains do not assist in the completion of those forms.     Physician has been contacted to help complete the form with family (son, legal next of kin) present. At present, physician and family have not completed a corrected form.     Health Care Decision Makers:       Primary Decision Maker: [place] Godoy - Child - [phone]  Summary:  No Decision Maker named by patient at this time    Advance Care Planning Documents (Patient Wishes):  Portable DNR Form       Interventions:  Provided education on documents for clarity and greater understanding  Discussed and provided education on state decision maker hierarchy    Care Preferences Communicated:   No    Outcomes/Plan:  ACP Discussion: Completed    Electronically signed by Chaplain Javier Resident on 11/24/2024 at 4:05 PM           
Charge nurse contacted attending MD to come to bedside to correct the DNR form with family present at bedside.  Awaiting response.  
Day #2 of Cefepime - Renal Dosing Update  Indication:  Sepsis of unknown etiology, suspected aspiration PNA  Current regimen:  1 gm IV Q 12 hr  Abx regimen: Monotherapy  Recent Labs     24  1330 24  0905 24  0909   WBC 20.0* 14.1*  --    CREATININE 1.39*  --  0.97   BUN 35*  --  29*     Est CrCl: ~30-40 ml/min; UO: -- ml/kg/hr  Temp (24hrs), Av.3 °F (36.8 °C), Min:97.6 °F (36.4 °C), Max:99.8 °F (37.7 °C)    Cultures:    Blood: NGTD   Urine: pending    Plan: Given the improvement in the patient's Scr, the dose of cefepime has been adjusted to 2 gm IV Q 12 hr per [facility] P[facility]T [facility] Protocol with respect to renal function.  Pharmacy will continue to monitor patient daily and will make dosage adjustments based upon changing renal function.       
NUTRITION     Nutrition screening referral was triggered based on results obtained during nursing admission assessment for unsure weight loss.  Pt answered \"no\" to eating poorly d/t decreased appetite.  The patient's chart was reviewed and nutrition assessment is not indicated at this time.  Plan to see patient for rescreen as indicated.  Thank you.     Weight History Weight - Scale Weight - Scale Weight Method   11/23/2024 141 lbs 3 oz  140 lbs 8 oz 64 kg  63.7 kg Bed scale   8/12/2024 145 lbs 65.8 kg Stated   2/15/2024 144 lbs 6 oz 65.5 kg Bed scale   11/28/2021 141 lbs 9 oz 64.2 kg -     Kianna Francis RD  Available via [employer]    
Patient having trouble swallowing her dinner; felt food was stuck in her throat. Patient vomited up a small amount of food. [place] was used to assist. Patient will need to eat slowly and in small amounts.  
Referral source:   Marielle Godoy at [facility] in [facility] 4 IM.  attended rounds in the IMCU as part of the Interdisciplinary team where the patient's ongoing care was discussed. I reviewed the medical record as part of this encounter.     Outcome: Interdisciplinary team are aware of  availability and were encouraged to request support as needed.      Advised nurse to contact Spiritual Care for any further referrals.The  on-call can be reached at [phone]).     Rev. Nakia Devlin MDiv, [facility]  Staff     
Speech LAnguage Pathology EVALUATION    Patient: Marielle Godoy (89 y.o. female)  Date: 11/24/2024  Primary Diagnosis: Elevated troponin [R79.89]  Elevated CK [R74.8]  Fall, initial encounter [W19.XXXA]  Sepsis (HCC) [A41.9]  Leukocytosis, unspecified type [D72.829]       Precautions:  Fall Risk                  ASSESSMENT :  Evaluation complete. Pt appears to have functional oropharyngeal phases of swallow. Suspect esophageal deficits are impacting swallowing. Noted that family has reported that they have noticed that the patient coughing more with food.  Patient noted to have reflux and esophageal dysmotility on esophagram in April 2024, and has a schatski's ring and hiatal hernia per EGD in August 2024.  Recommend regular diet/thin liquids with strict esophageal precautions (upright during meals and for 30 minutes following; small sips and bites; alternate liquids/solids; extra sauces/gravies to moisten food; meds whole in applesauce). Would consider increasing reflux medications to BID and ensure that esophagitis is being addressed.     Appreciate Dr. Peterson already discussing with the family about how the research shows that in patients with advanced dementia PEG tube placement is associated with substantial patient burdens including recurrent and new onset aspiration, tube-associated and aspiration-related infection, increased oral secretions that are difficult to manage, discomfort, tube malfunction, pressure wounds, and the use of physical and chemical restraints. Furthermore, in the geriatric population, research shows that there is no proven benefit in weight gain or markers of nutrition (albumin, prealbumin) in patients with malnutrition due to impaired oral intake. Lastly, given this patient's already high risk for post-prandial aspiration, a PEG tube would only increase the risk for post-prandial aspiration.     Patient will benefit from skilled intervention to address the above impairments.     PLAN 
Spiritual Health History and Assessment/Progress Note  [facility] [place]    (P) Advance Care Planning,  ,  ,      Name: Marielle Godoy MRN: [id number]    Age: 89 y.o.     Sex: female   Language: English   Tenriism: None   Sepsis (HCC)     Date: 11/24/2024            Total Time Calculated: (P) 35 min              Spiritual Assessment began in [facility] 4 IM        Referral/Consult From: (P) Rounding   Encounter Overview/Reason: (P) Advance Care Planning  Service Provided For: (P) Family (Son)    Tanika, Belief, Meaning:   Patient unable to assess at this time  Family/Friends have beliefs or practices that help with coping during difficult times      Importance and Influence:  Patient unable to assess at this time  Family/Friends unable to assess at this time    Community  Family/Friends feel well-supported. Support system includes: Extended family    Assessment and Plan of Care:   Family/Friends Interventions include: Affirmed coping skills/support systems    Patient Plan of Care: Spiritual Care available upon further referral  Family/Friends Plan of Care: Spiritual Care available upon further referral    Electronically signed by Michelle Herrerain Resident on 11/24/2024 at 4:02 PM    
    UPPER GASTROINTESTINAL ENDOSCOPY N/A 8/12/2024    ESOPHAGOGASTRODUODENOSCOPY performed by Noe Gale MD at [facility] ENDOSCOPY     Prior Level of Function/Home Situation:   Social/Functional History  Type of Home:  (Lives in [facility])  Bathroom Shower/Tub: Walk-in shower  Bathroom Equipment: Built-in shower seat, Grab bars in shower  Home Equipment: Rollator  Has the patient had two or more falls in the past year or any fall with injury in the past year?: Yes  ADL Assistance: Independent (able to shower indep)  Homemaking Assistance: Needs assistance (went to meals in dining room, did breakfast in apt)    Cognitive and Communication Status:  Neurologic State: Alert  Orientation Level: Oriented to person  Cognition: Follows commands, Impulsive, and Memory loss    Outcome Measure:  Functional Oral Intake Scale (FOIS): 6--Total oral diet with multiple consistencies without special preparation, but with specific food limitations    After treatment:   Call bell left within reach and Nursing notified    COMMUNICATION/EDUCATION:     The patient's plan of care including recommendations, planned interventions, and recommended diet changes were discussed with: Case management and Certified nursing assistant/patient care technician    Patient is unable to participate in goal setting and plan of care    Thank you,  Enid Pino, SLP          
clinical/nonclinical/ nursing providers based on care coordination needs.         Patients current active Medications were reviewed, considered, added and adjusted based on the clinical condition today.      Home Medications were reconciled to the best of my ability given all available resources at the time of admission. Route is PO if not otherwise noted.      Admission Status:[id number]:::1}      Medications Reviewed:     Current Facility-Administered Medications   Medication Dose Route Frequency    pantoprazole (PROTONIX) 40 mg in sodium chloride (PF) 0.9 % 10 mL injection  40 mg IntraVENous Daily    sodium chloride flush 0.9 % injection 5-40 mL  5-40 mL IntraVENous 2 times per day    sodium chloride flush 0.9 % injection 5-40 mL  5-40 mL IntraVENous PRN    0.9 % sodium chloride infusion   IntraVENous PRN    ondansetron (ZOFRAN-ODT) disintegrating tablet 4 mg  4 mg Oral Q8H PRN    Or    ondansetron (ZOFRAN) injection 4 mg  4 mg IntraVENous Q6H PRN    polyethylene glycol (GLYCOLAX) packet 17 g  17 g Oral Daily PRN    acetaminophen (TYLENOL) tablet 650 mg  650 mg Oral Q6H PRN    Or    acetaminophen (TYLENOL) suppository 650 mg  650 mg Rectal Q6H PRN    heparin (porcine) injection 5,000 Units  5,000 Units SubCUTAneous 3 times per day    0.9 % sodium chloride infusion   IntraVENous Continuous    ceFEPIme (MAXIPIME) 2,000 mg in sodium chloride 0.9 % 100 mL IVPB (mini-bag)  2,000 mg IntraVENous Q12H     ______________________________________________________________________  EXPECTED LENGTH OF STAY: 3  ACTUAL LENGTH OF STAY:          2                 Lele Peterson MD     
PRN    Or    ondansetron (ZOFRAN) injection 4 mg  4 mg IntraVENous Q6H PRN    polyethylene glycol (GLYCOLAX) packet 17 g  17 g Oral Daily PRN    acetaminophen (TYLENOL) tablet 650 mg  650 mg Oral Q6H PRN    Or    acetaminophen (TYLENOL) suppository 650 mg  650 mg Rectal Q6H PRN    heparin (porcine) injection 5,000 Units  5,000 Units SubCUTAneous 3 times per day    0.9 % sodium chloride infusion   IntraVENous Continuous     ______________________________________________________________________  EXPECTED LENGTH OF STAY: 3  ACTUAL LENGTH OF STAY:          1                 Lele Peterson MD

## 2024-11-27 NOTE — CARE COORDINATION
Transition of Care Plan to SNF/Rehab    Communication to Patient/Family:  Met with patient and family and they are agreeable to the transition plan. The Plan for Transition of Care is related to the following treatment goals: SNF    The Patient and/or patient representative was provided with a choice of provider and agrees  with the discharge plan.      Yes [x] No []    A Freedom of choice list was provided with basic dialogue that supports the patient's individualized plan of care/goals and shares the quality data associated with the providers.       Yes [x] No []    SNF/Rehab Transition:  Patient has been accepted to [facility] SNF/Rehab and meets criteria for admission.   Date of Inpatient Status Order: 11/22  Patient will transported by [facility]  and expected to leave at 1315    Communication to SNF/Rehab:  Bedside RN, Adelina, has been notified to update the transition plan to the facility and call report ([phone] Room 333.  Discharge information has been updated on the AVS. And communicated to facility via [employer], or CC link.     Discharge instructions to be fax'd to facility at (Fax #).      Nursing Please include all hard scripts for controlled substances, med rec and dc summary, and AVS in packet.     Reviewed and confirmed with facility, [facility], can manage the patient care needs for the following:     James with (X) only those applicable:  Medication:  [x]Medications are available at the facility  []IV Antibiotics    []Controlled Substance - hard copies available sent.  []Weekly Labs    Equipment:  []CPAP/BiPAP  []Wound Vacuum  []Cassidy or Urinary Device  []PICC/Central Line  []Nebulizer  []Ventilator    Treatment:  []Isolation (for MRSA, VRE, etc.)  []Surgical Drain Management  []Tracheostomy Care  []Dressing Changes  []Dialysis with transportation  []PEG Care  []Oxygen  []Daily Weights for Heart Failure    Dietary:  []Any diet limitations  []Tube Feedings   []Total Parenteral

## (undated) DEVICE — ESOPHAGEAL BALLOON DILATATION CATHETER: Brand: CRE FIXED WIRE

## (undated) DEVICE — SYRINGE INFL 60ML DISP ALLIANCE II

## (undated) DEVICE — FORCEPS BX L240CM JAW DIA2.8MM L CAP W/ NDL MIC MESH TOOTH

## (undated) DEVICE — ORISE PROKNIFE 3.0 MM ELECTRODE: Brand: ORISE™ PROKNIFE

## (undated) DEVICE — ORISE PROKNIFE 1.5 MM ELECTRODE: Brand: ORISE™ PROKNIFE